# Patient Record
Sex: MALE | Race: WHITE | NOT HISPANIC OR LATINO | Employment: UNEMPLOYED | URBAN - METROPOLITAN AREA
[De-identification: names, ages, dates, MRNs, and addresses within clinical notes are randomized per-mention and may not be internally consistent; named-entity substitution may affect disease eponyms.]

---

## 2018-11-19 ENCOUNTER — OFFICE VISIT - HEALTHEAST (OUTPATIENT)
Dept: INTERNAL MEDICINE | Facility: CLINIC | Age: 58
End: 2018-11-19

## 2018-11-19 ENCOUNTER — COMMUNICATION - HEALTHEAST (OUTPATIENT)
Dept: TELEHEALTH | Facility: CLINIC | Age: 58
End: 2018-11-19

## 2018-11-19 DIAGNOSIS — E11.8 TYPE 2 DIABETES MELLITUS WITH COMPLICATION, UNSPECIFIED WHETHER LONG TERM INSULIN USE: ICD-10-CM

## 2018-11-19 DIAGNOSIS — Z00.00 ROUTINE GENERAL MEDICAL EXAMINATION AT A HEALTH CARE FACILITY: ICD-10-CM

## 2018-11-19 LAB
ALBUMIN SERPL-MCNC: 4.1 G/DL (ref 3.5–5)
ALP SERPL-CCNC: 111 U/L (ref 45–120)
ALT SERPL W P-5'-P-CCNC: 18 U/L (ref 0–45)
ANION GAP SERPL CALCULATED.3IONS-SCNC: 12 MMOL/L (ref 5–18)
AST SERPL W P-5'-P-CCNC: 16 U/L (ref 0–40)
BASOPHILS # BLD AUTO: 0 THOU/UL (ref 0–0.2)
BASOPHILS NFR BLD AUTO: 1 % (ref 0–2)
BILIRUB SERPL-MCNC: 0.5 MG/DL (ref 0–1)
BUN SERPL-MCNC: 17 MG/DL (ref 8–22)
CALCIUM SERPL-MCNC: 10.5 MG/DL (ref 8.5–10.5)
CHLORIDE BLD-SCNC: 98 MMOL/L (ref 98–107)
CHOLEST SERPL-MCNC: 316 MG/DL
CO2 SERPL-SCNC: 25 MMOL/L (ref 22–31)
CREAT SERPL-MCNC: 1.1 MG/DL (ref 0.7–1.3)
CREAT UR-MCNC: 52.2 MG/DL
EOSINOPHIL # BLD AUTO: 0 THOU/UL (ref 0–0.4)
EOSINOPHIL NFR BLD AUTO: 1 % (ref 0–6)
ERYTHROCYTE [DISTWIDTH] IN BLOOD BY AUTOMATED COUNT: 13 % (ref 11–14.5)
FASTING STATUS PATIENT QL REPORTED: YES
GFR SERPL CREATININE-BSD FRML MDRD: >60 ML/MIN/1.73M2
GLUCOSE BLD-MCNC: 390 MG/DL (ref 70–125)
HBA1C MFR BLD: >14 % (ref 3.5–6)
HCT VFR BLD AUTO: 45.8 % (ref 40–54)
HDLC SERPL-MCNC: 67 MG/DL
HGB BLD-MCNC: 15.1 G/DL (ref 14–18)
LDLC SERPL CALC-MCNC: 223 MG/DL
LYMPHOCYTES # BLD AUTO: 1.7 THOU/UL (ref 0.8–4.4)
LYMPHOCYTES NFR BLD AUTO: 40 % (ref 20–40)
MCH RBC QN AUTO: 29.1 PG (ref 27–34)
MCHC RBC AUTO-ENTMCNC: 33 G/DL (ref 32–36)
MCV RBC AUTO: 88 FL (ref 80–100)
MICROALBUMIN UR-MCNC: 5.21 MG/DL (ref 0–1.99)
MICROALBUMIN/CREAT UR: 99.8 MG/G
MONOCYTES # BLD AUTO: 0.4 THOU/UL (ref 0–0.9)
MONOCYTES NFR BLD AUTO: 8 % (ref 2–10)
NEUTROPHILS # BLD AUTO: 2.2 THOU/UL (ref 2–7.7)
NEUTROPHILS NFR BLD AUTO: 51 % (ref 50–70)
PLATELET # BLD AUTO: 327 THOU/UL (ref 140–440)
PMV BLD AUTO: 9.5 FL (ref 8.5–12.5)
POTASSIUM BLD-SCNC: 5.1 MMOL/L (ref 3.5–5)
PROT SERPL-MCNC: 7.5 G/DL (ref 6–8)
RBC # BLD AUTO: 5.19 MILL/UL (ref 4.4–6.2)
SODIUM SERPL-SCNC: 135 MMOL/L (ref 136–145)
TRIGL SERPL-MCNC: 130 MG/DL
TSH SERPL DL<=0.005 MIU/L-ACNC: 2.61 UIU/ML (ref 0.3–5)
WBC: 4.4 THOU/UL (ref 4–11)

## 2018-11-19 ASSESSMENT — MIFFLIN-ST. JEOR: SCORE: 1587.93

## 2018-11-27 ENCOUNTER — COMMUNICATION - HEALTHEAST (OUTPATIENT)
Dept: PHARMACY | Facility: CLINIC | Age: 58
End: 2018-11-27

## 2018-12-21 ENCOUNTER — COMMUNICATION - HEALTHEAST (OUTPATIENT)
Dept: ADMINISTRATIVE | Facility: CLINIC | Age: 58
End: 2018-12-21

## 2019-01-18 ENCOUNTER — AMBULATORY - HEALTHEAST (OUTPATIENT)
Dept: ENDOCRINOLOGY | Facility: CLINIC | Age: 59
End: 2019-01-18

## 2019-01-18 ENCOUNTER — OFFICE VISIT - HEALTHEAST (OUTPATIENT)
Dept: ENDOCRINOLOGY | Facility: CLINIC | Age: 59
End: 2019-01-18

## 2019-01-18 DIAGNOSIS — E10.9 DIABETES MELLITUS TYPE 1 (H): ICD-10-CM

## 2019-01-18 DIAGNOSIS — E11.40 TYPE 2 DIABETES MELLITUS WITH DIABETIC NEUROPATHY, WITHOUT LONG-TERM CURRENT USE OF INSULIN (H): ICD-10-CM

## 2019-01-18 ASSESSMENT — MIFFLIN-ST. JEOR: SCORE: 1611.4

## 2019-02-14 ENCOUNTER — OFFICE VISIT - HEALTHEAST (OUTPATIENT)
Dept: FAMILY MEDICINE | Facility: CLINIC | Age: 59
End: 2019-02-14

## 2019-02-14 ENCOUNTER — COMMUNICATION - HEALTHEAST (OUTPATIENT)
Dept: SCHEDULING | Facility: CLINIC | Age: 59
End: 2019-02-14

## 2019-02-14 DIAGNOSIS — L84 CALLUS OF FOOT: ICD-10-CM

## 2019-02-14 DIAGNOSIS — Z79.4 TYPE 2 DIABETES MELLITUS WITH OTHER SKIN COMPLICATION, WITH LONG-TERM CURRENT USE OF INSULIN (H): ICD-10-CM

## 2019-02-14 DIAGNOSIS — J01.00 ACUTE NON-RECURRENT MAXILLARY SINUSITIS: ICD-10-CM

## 2019-02-14 DIAGNOSIS — E11.628 TYPE 2 DIABETES MELLITUS WITH OTHER SKIN COMPLICATION, WITH LONG-TERM CURRENT USE OF INSULIN (H): ICD-10-CM

## 2019-02-14 DIAGNOSIS — R09.81 NASAL CONGESTION: ICD-10-CM

## 2019-02-14 DIAGNOSIS — R51.9 ACUTE FACIAL PAIN: ICD-10-CM

## 2019-02-18 ENCOUNTER — COMMUNICATION - HEALTHEAST (OUTPATIENT)
Dept: FAMILY MEDICINE | Facility: CLINIC | Age: 59
End: 2019-02-18

## 2019-02-18 DIAGNOSIS — R09.81 NASAL CONGESTION: ICD-10-CM

## 2019-03-09 ENCOUNTER — COMMUNICATION - HEALTHEAST (OUTPATIENT)
Dept: ENDOCRINOLOGY | Facility: CLINIC | Age: 59
End: 2019-03-09

## 2019-03-09 DIAGNOSIS — E11.49 TYPE 2 DIABETES MELLITUS WITH NEUROLOGIC COMPLICATION, WITHOUT LONG-TERM CURRENT USE OF INSULIN (H): ICD-10-CM

## 2019-03-21 ENCOUNTER — AMBULATORY - HEALTHEAST (OUTPATIENT)
Dept: INTERNAL MEDICINE | Facility: CLINIC | Age: 59
End: 2019-03-21

## 2019-03-21 ENCOUNTER — COMMUNICATION - HEALTHEAST (OUTPATIENT)
Dept: INTERNAL MEDICINE | Facility: CLINIC | Age: 59
End: 2019-03-21

## 2020-03-26 ENCOUNTER — OFFICE VISIT - HEALTHEAST (OUTPATIENT)
Dept: INTERNAL MEDICINE | Facility: CLINIC | Age: 60
End: 2020-03-26

## 2020-03-26 DIAGNOSIS — E11.40 TYPE 2 DIABETES MELLITUS WITH DIABETIC NEUROPATHY, WITHOUT LONG-TERM CURRENT USE OF INSULIN (H): ICD-10-CM

## 2020-03-26 DIAGNOSIS — R30.0 DYSURIA: ICD-10-CM

## 2020-03-30 ENCOUNTER — COMMUNICATION - HEALTHEAST (OUTPATIENT)
Dept: INTERNAL MEDICINE | Facility: CLINIC | Age: 60
End: 2020-03-30

## 2020-08-11 ENCOUNTER — COMMUNICATION - HEALTHEAST (OUTPATIENT)
Dept: INTERNAL MEDICINE | Facility: CLINIC | Age: 60
End: 2020-08-11

## 2020-08-11 ENCOUNTER — RECORDS - HEALTHEAST (OUTPATIENT)
Dept: ADMINISTRATIVE | Facility: OTHER | Age: 60
End: 2020-08-11

## 2020-08-11 ENCOUNTER — HOSPITAL ENCOUNTER (OUTPATIENT)
Dept: LAB | Facility: CLINIC | Age: 60
End: 2020-08-11
Attending: STUDENT IN AN ORGANIZED HEALTH CARE EDUCATION/TRAINING PROGRAM

## 2020-08-11 LAB
ALBUMIN (URINE) MG/SPEC: 20 MG/DL
ALT SERPL W/O P-5'-P-CCNC: 26 U/L (ref 0–70)
AST SERPL-CCNC: 14 U/L (ref 0–45)
CHOLEST SERPL-MCNC: 290 MG/DL
CREAT SERPL-MCNC: 0.88 MG/DL (ref 0.66–1.25)
GFR ESTIMATE EXT - HISTORICAL: >90 ML/MIN/1.73M2
GFR ESTIMATE, IF BLACK EXT - HISTORICAL: >90 ML/MIN/1.73M2
HDLC SERPL-MCNC: 64 MG/DL
LDLC SERPL CALC-MCNC: 204 MG/DL
NON HDL CHOL. (LDL+VLDL): 226 MG/DL
TRIGLYCERIDES (HISTORICAL CONVERSION): 108 MG/DL

## 2020-08-13 ENCOUNTER — COMMUNICATION - HEALTHEAST (OUTPATIENT)
Dept: INTERNAL MEDICINE | Facility: CLINIC | Age: 60
End: 2020-08-13

## 2020-08-13 ENCOUNTER — OFFICE VISIT - HEALTHEAST (OUTPATIENT)
Dept: INTERNAL MEDICINE | Facility: CLINIC | Age: 60
End: 2020-08-13

## 2020-08-13 DIAGNOSIS — Z12.11 SCREEN FOR COLON CANCER: ICD-10-CM

## 2020-08-13 DIAGNOSIS — G62.9 PERIPHERAL POLYNEUROPATHY: ICD-10-CM

## 2020-08-13 DIAGNOSIS — Z00.00 HEALTH MAINTENANCE EXAMINATION: ICD-10-CM

## 2020-08-13 DIAGNOSIS — E11.40 TYPE 2 DIABETES MELLITUS WITH DIABETIC NEUROPATHY, WITHOUT LONG-TERM CURRENT USE OF INSULIN (H): ICD-10-CM

## 2020-08-13 DIAGNOSIS — N30.00 ACUTE CYSTITIS WITHOUT HEMATURIA: ICD-10-CM

## 2020-08-13 DIAGNOSIS — I73.9 CLAUDICATION (H): ICD-10-CM

## 2020-08-13 DIAGNOSIS — M25.551 HIP PAIN, RIGHT: ICD-10-CM

## 2020-08-13 RX ORDER — ROSUVASTATIN CALCIUM 10 MG/1
10 TABLET, COATED ORAL AT BEDTIME
Qty: 30 TABLET | Refills: 11 | Status: ON HOLD | OUTPATIENT
Start: 2020-08-13 | End: 2022-01-11

## 2020-08-13 ASSESSMENT — MIFFLIN-ST. JEOR: SCORE: 1564.68

## 2020-08-14 ENCOUNTER — COMMUNICATION - HEALTHEAST (OUTPATIENT)
Dept: NURSING | Facility: CLINIC | Age: 60
End: 2020-08-14

## 2020-08-14 ENCOUNTER — COMMUNICATION - HEALTHEAST (OUTPATIENT)
Dept: INTERNAL MEDICINE | Facility: CLINIC | Age: 60
End: 2020-08-14

## 2020-08-14 RX ORDER — GLUCOSAMINE HCL/CHONDROITIN SU 500-400 MG
1 CAPSULE ORAL 3 TIMES DAILY
Qty: 100 STRIP | Refills: 11 | Status: SHIPPED | OUTPATIENT
Start: 2020-08-14

## 2020-08-17 ENCOUNTER — RECORDS - HEALTHEAST (OUTPATIENT)
Dept: HEALTH INFORMATION MANAGEMENT | Facility: CLINIC | Age: 60
End: 2020-08-17

## 2020-08-19 ENCOUNTER — COMMUNICATION - HEALTHEAST (OUTPATIENT)
Dept: INTERNAL MEDICINE | Facility: CLINIC | Age: 60
End: 2020-08-19

## 2020-08-20 ENCOUNTER — COMMUNICATION - HEALTHEAST (OUTPATIENT)
Dept: NURSING | Facility: CLINIC | Age: 60
End: 2020-08-20

## 2020-08-27 ENCOUNTER — COMMUNICATION - HEALTHEAST (OUTPATIENT)
Dept: NURSING | Facility: CLINIC | Age: 60
End: 2020-08-27

## 2020-08-27 ENCOUNTER — OFFICE VISIT - HEALTHEAST (OUTPATIENT)
Dept: INTERNAL MEDICINE | Facility: CLINIC | Age: 60
End: 2020-08-27

## 2020-08-27 DIAGNOSIS — E78.2 MIXED HYPERLIPIDEMIA: ICD-10-CM

## 2020-08-27 DIAGNOSIS — E11.65 TYPE 2 DIABETES MELLITUS WITH HYPERGLYCEMIA, WITHOUT LONG-TERM CURRENT USE OF INSULIN (H): ICD-10-CM

## 2020-08-28 ENCOUNTER — COMMUNICATION - HEALTHEAST (OUTPATIENT)
Dept: NURSING | Facility: CLINIC | Age: 60
End: 2020-08-28

## 2020-08-28 ASSESSMENT — ACTIVITIES OF DAILY LIVING (ADL): DEPENDENT_IADLS:: INDEPENDENT

## 2020-09-10 ENCOUNTER — OFFICE VISIT - HEALTHEAST (OUTPATIENT)
Dept: INTERNAL MEDICINE | Facility: CLINIC | Age: 60
End: 2020-09-10

## 2020-09-10 DIAGNOSIS — E11.65 TYPE 2 DIABETES MELLITUS WITH HYPERGLYCEMIA, WITHOUT LONG-TERM CURRENT USE OF INSULIN (H): ICD-10-CM

## 2020-09-10 ASSESSMENT — MIFFLIN-ST. JEOR: SCORE: 1592.81

## 2020-09-11 ENCOUNTER — COMMUNICATION - HEALTHEAST (OUTPATIENT)
Dept: INTERNAL MEDICINE | Facility: CLINIC | Age: 60
End: 2020-09-11

## 2020-09-14 ENCOUNTER — COMMUNICATION - HEALTHEAST (OUTPATIENT)
Dept: NURSING | Facility: CLINIC | Age: 60
End: 2020-09-14

## 2020-09-14 ENCOUNTER — COMMUNICATION - HEALTHEAST (OUTPATIENT)
Dept: INTERNAL MEDICINE | Facility: CLINIC | Age: 60
End: 2020-09-14

## 2020-11-09 ENCOUNTER — COMMUNICATION - HEALTHEAST (OUTPATIENT)
Dept: INTERNAL MEDICINE | Facility: CLINIC | Age: 60
End: 2020-11-09

## 2020-11-09 DIAGNOSIS — E11.40 TYPE 2 DIABETES MELLITUS WITH DIABETIC NEUROPATHY, WITHOUT LONG-TERM CURRENT USE OF INSULIN (H): ICD-10-CM

## 2021-06-02 VITALS — BODY MASS INDEX: 21.43 KG/M2 | WEIGHT: 158 LBS

## 2021-06-02 VITALS — BODY MASS INDEX: 22.8 KG/M2 | HEIGHT: 72 IN | WEIGHT: 168.3 LBS

## 2021-06-02 VITALS — HEIGHT: 72 IN | WEIGHT: 163.13 LBS | BODY MASS INDEX: 22.09 KG/M2

## 2021-06-04 VITALS
BODY MASS INDEX: 20.75 KG/M2 | SYSTOLIC BLOOD PRESSURE: 112 MMHG | DIASTOLIC BLOOD PRESSURE: 68 MMHG | HEART RATE: 100 BPM | WEIGHT: 153 LBS

## 2021-06-04 VITALS
OXYGEN SATURATION: 96 % | HEART RATE: 104 BPM | DIASTOLIC BLOOD PRESSURE: 62 MMHG | SYSTOLIC BLOOD PRESSURE: 96 MMHG | WEIGHT: 158 LBS | BODY MASS INDEX: 21.4 KG/M2 | HEIGHT: 72 IN

## 2021-06-04 VITALS
HEART RATE: 78 BPM | HEIGHT: 72 IN | DIASTOLIC BLOOD PRESSURE: 62 MMHG | BODY MASS INDEX: 22.24 KG/M2 | WEIGHT: 164.2 LBS | SYSTOLIC BLOOD PRESSURE: 122 MMHG

## 2021-06-07 NOTE — PROGRESS NOTES
Curtis Varghese is a 59 y.o. male who is being evaluated via a billable telephone visit.      Curtis Varghese complains of    Chief Complaint   Patient presents with     Urinary Retention     Diabetes       Assessment/Plan:  1. Dysuria  Curtis Varghese he is a 59-year-old man who presents for telephone visit related to urinary issues.  From what I can tell, he has polyuria and polydipsia secondary to probable hyperglycemia.  He essentially has not done anything since the last clinic visit with 1 of our former colleagues and also with endocrinology.  He has not taken the insulin that was recommended.  Since he had a little dysuria, we will screen for UTI.  I have a low suspicion that his prostate is the primary issue.  - Urinalysis-UC if Indicated; Future    2. Type 2 diabetes mellitus with diabetic neuropathy, without long-term current use of insulin (H)  Curtis essentially presented 1 year ago with wildly uncontrolled diabetes and since then has done nearly nothing about it.  It seems he had a visit with endocrinology and reading between the lines, the endocrinology specialist had a hard time getting through to him.  I sense that Curtis has some skepticism with standard of care medicine and may have some unrealistic expectations with what can be accomplished with diet alone.  Generally when the A1c is above 14, most people need some degree of insulin therapy, if only for a short term to get the numbers back under control.  I fully expect the A1c to be above 14, the glucose to be very high, and there may or may not be presence of UTI, but we will have to have a heart-to-heart regarding diabetes control.  To top it off, he has a very high LDL cholesterol over 220, I think he is at high risk for having a heart attack in the intermediate future.  - Basic Metabolic Panel; Future  - Glycosylated Hemoglobin A1c; Future      Additional provider notes:     Mr. Varghese is a 59-year-old man with history of type 2  "diabetes who presents for telephone visit out of concern for urinary issues.    In recent months, he grew concerned about his prostate. For some time he has had symptoms of discomfort in the lower inner hip bones (groin), exhaustion (normal sleep not enough to recharge), urinating takes longer, slight burning.  He notes some hesitation.  There urinary stream is average. He is able to completely empty.  He notes more frequent urination.  He is set up to get a PSA test separately so he does not want to check through us.     The other day he noted the stool was so compacted that he had trouble passing it.  It felt like it was trying to pass a rock.  He has not had any recent colonoscopy (alluding to some connection to bladder or prostate issues).     He was seen once in our clinic by one of the family medicine providers who was filling in during a period of shortage.  He does not have a PCP.  He had a physical in November 2018, and at that time his A1c was above the detectable limits (>14), had dramatically elevated LDL at 223, glucose was 390, and microalbumin/creatinine ratio was significantly elevated at 99.8.  He saw endocrinology but apparently decided not to take the insulin recommended.      He cites various dietary means to control diabetes: keto diet and juicing (vegetable, not fruit).  He has been busy and not checking his blood sugars lately, too busy to diet.      He feels the need to drink a lot of water.     His workplace requires a doctor's note to allow him to have certain food in the workplace area.  He plans to do a ketogenic diet.  He wants me to write a note to allow him to have food in the workplace which he thinks would help support his keto diet.        I have reviewed and updated the patient's Past Medical History, Social History, Family History, Allergies and Medication List.      The patient has been notified of following:     \"This telephone visit will be conducted via a call between you and " "your physician/provider. We have found that certain health care needs can be provided without the need for a physical exam.  This service lets us provide the care you need with a short phone conversation.  If a prescription is necessary we can send it directly to your pharmacy.  If lab work is needed we can place an order for that and you can then stop by our lab to have the test done at a later time.    If during the course of the call the physician/provider feels a telephone visit is not appropriate, you will not be charged for this service.\"       Phone call duration:  20 minutes      "

## 2021-06-07 NOTE — TELEPHONE ENCOUNTER
Pt requesting note from dr silva    He has left a note for Dr Silva    PLEASE CALL PT WHEN NOT IS READY  [phone] [employer]

## 2021-06-07 NOTE — TELEPHONE ENCOUNTER
Reviewed note from pt.  Pt is requesting a letter from Dr. Silva stating for medical reasons he needs to have the allowance of special foods, medications and juices in his work area.    Will route to Dr. Silva to advise if okay for letter.

## 2021-06-10 NOTE — TELEPHONE ENCOUNTER
Attempted to contact pt.  Home phone VM inbox is full, unable to leave message.  Attempted to contact mobile phone and number is not available.  Will try again later.  If pt calls, he can fax his [facility] paperwork to [phone].

## 2021-06-10 NOTE — TELEPHONE ENCOUNTER
Spoke with pt who reports he's work will be faxing over the paperwork to [place], as he's having an [facility] care appt there tomorrow.

## 2021-06-10 NOTE — PROGRESS NOTES
"[facility] Clinic Note    Curtis Varghese   59 y.o. male    Date of Visit: 8/13/2020  Chief Complaint   Patient presents with     Annual Exam     not fasting     Colon Cancer Screening     patient due, orders pending       ASSESSMENT/PLAN  1. Health maintenance examination     2. Type 2 diabetes mellitus with diabetic neuropathy, without long-term current use of insulin (H)  Ambulatory referral to Care Management (Primary Care)    rosuvastatin (CRESTOR) 10 MG tablet    metFORMIN (GLUCOPHAGE) 500 MG tablet    insulin NPH-insulin regular (NOVOLIN 70-30) 100 unit/mL (70-30) injection    Ambulatory referral to Diabetes Education Program (Existing Diagnosis)    pen needle, diabetic (UNIFINE PENTIPS) 31 gauge x 3/16\" Ndle   3. Screen for colon cancer  Ambulatory referral for Colonoscopy   4. Peripheral polyneuropathy     5. Hip pain, right     6. Claudication (H)  US JAVI Doppler No Exercise, 1-2 Levels, Bilateral   7. Acute cystitis without hematuria       ---------------------------------------------    1. Curtis Varghese is here for a physical. Alarmingly, we last saw him 21 months ago at which time his a1c was >14%.  It sounds like he has not taken insulin or medications in the meantime.  This is the main issue, see #2.  He is agreeable to updating colonoscopy.  We weren't able to get to the topic of vaccines, but he is current on Td.  He is not obese, but should be exercising regularly.  His low energy is likely from uncontrolled diabetes    2. As suspected, nothing has changed to improve wildly uncontrolled diabetes over the last 21 months.  Several times he brought up the subject of \"juicing\" or \"berberine\".  The idea that a supplement of this sort is either naive or magical thinking. Why he has not sought care or followed up as requested (tried to get him in for A1c back in March), I cannot answer.  He alludes to some financial obstacles such as affording insulin (mentioned this when talking to " endocrinology- saw once in 2019 and told them he would not take insulin).  He has developing symptoms of retinopathy or cataracts, peripheral neuropathy, and possibly even claudication.    --start aspirin and rosuvastatin (LDL >200)  --start metformin 500 mg two times a day  --start insulin NPH/reg 70/30 pen-- take 10 units two times a day to start with need to increase (this is only starting 0.27 units/kg/day)  --possibly can get this above medication in pen form through [facility] (Reli-[employer]).   --prescribe testing supplies, glucometer, test strips, lancets  --referral to diabetes educator (I think if we leave it to him he may not be good about making appointments to come back)      3. Colonoscopy ordered    4. Suspect developing neuropathy based on symptoms    5. R hip pain- not tender, no hip ROM difficulties or pain with this.  He was trying to explain he feels that there is a restriction in blood flow to legs.  He has good posterior tibial pulses, but some symptoms of leg cramping.  Given very odd way of presenting history, I thought we check ABIs to be sure    6. See above    7. Tests positive for UTI- treat with cipro 10 days.  Cause is likely uncontrolled diabetes, will focus on better control    Return in about 2 weeks (around 8/27/2020) for Diabetes.      SUBJECTIVE  Curtis Varghese is a 59-year-old man with history of type 2 diabetes, who presents for annual physical.    Last A1c was checked in 2018, found to be off the charts about 14.  He has failed to follow-up in person since then with me, but did see endocrinology once in 2019.  He explained he would not take insulin because of cost.      He has been feeling physically exhausted, sleeping 10.5 hours, goes to work, but not having energy to do anything else during the week.  Even when getting to work he is not very motivated.      He started to get right hip pain and stopped carrying his phone there.   He has a feeling of something blocking the bloodflow  to the legs.  He feels some numbness in his feet, has pain walking without shoes, so he tries to wear his shoes around (under ball of feet).  He's taking a dietary supplement which helps. He tries Berberine.       Eyes feel like they are changing, glasses not working as they should any more. Last eye exam was around 2015.       ROS A comprehensive review of systems was performed and was otherwise negative    Medications, allergies, and problem list were reviewed and updated    Patient Active Problem List   Diagnosis     Type 2 diabetes mellitus with neurologic complication, without long-term current use of insulin (H)     Nasal congestion     Peripheral polyneuropathy     Past Medical History:   Diagnosis Date     Acute facial pain 2/14/2019     Type 2 diabetes mellitus with neurologic complication, without long-term current use of insulin (H) 1/20/2019     No past surgical history on file.  Social History     Socioeconomic History     Marital status: Single     Spouse name: Not on file     Number of children: Not on file     Years of education: Not on file     Highest education level: Not on file   Occupational History     Not on file   Social Needs     Financial resource strain: Not on file     Food insecurity     Worry: Not on file     Inability: Not on file     Transportation needs     Medical: Not on file     Non-medical: Not on file   Tobacco Use     Smoking status: Never Smoker     Smokeless tobacco: Never Used   Substance and Sexual Activity     Alcohol use: No     Frequency: Never     Drug use: No     Sexual activity: Never   Lifestyle     Physical activity     Days per week: Not on file     Minutes per session: Not on file     Stress: Not on file   Relationships     Social connections     Talks on phone: Not on file     Gets together: Not on file     Attends [employer] service: Not on file     Active member of club or organization: Not on file     Attends meetings of clubs or organizations: Not on file      "Relationship status: Not on file     Intimate partner violence     Fear of current or ex partner: Not on file     Emotionally abused: Not on file     Physically abused: Not on file     Forced sexual activity: Not on file   Other Topics Concern     Not on file   Social History Narrative     Not on file     No family history on file.    Current Outpatient Medications   Medication Sig Dispense Refill     aspirin 81 MG EC tablet Take 1 tablet (81 mg total) by mouth daily. 150 tablet 2     ciprofloxacin HCl (CIPRO) 500 MG tablet Take 1 tablet (500 mg total) by mouth 2 (two) times a day for 10 days. 20 tablet 0     insulin NPH-insulin regular (NOVOLIN 70-30) 100 unit/mL (70-30) injection Inject 10 Units under the skin 2 (two) times a day before meals. 11.65 Type 2 with hyperglycemia 6 mL 0     metFORMIN (GLUCOPHAGE) 500 MG tablet Take 1 tablet (500 mg total) by mouth 2 (two) times a day with meals. 60 tablet 11     pen needle, diabetic (UNIFINE PENTIPS) 31 gauge x 3/16\" Ndle Use 1 each As Directed 2 (two) times a day. 100 each 3     rosuvastatin (CRESTOR) 10 MG tablet Take 1 tablet (10 mg total) by mouth at bedtime. 30 tablet 11     No current facility-administered medications for this visit.        No Known Allergies    EXAM  Vitals:    08/13/20 1412   BP: 96/62   Patient Site: Left Arm   Patient Position: Sitting   Cuff Size: Adult Regular   Pulse: (!) 104   SpO2: 96%   Weight: 158 lb (71.7 kg)   Height: 6' (1.829 m)         General: alert, no distress  HEENT: sclerae anicteric, moist oral mucosa  Heart: Regular rate and rhythm, no murmurs.  No pretibial edema.  Warm extremities  Lungs: Clear to auscultation bilat  Gastrointestinal: abdomen is soft, non-tender, non-distended.    Skin: warm/dry, no rashes  Neuro: no gross abnormalities  MSK: normal ROM R hip without pain, no tenderness over greater trochanter    8/11/2020 labs-- scanned    A1c >14%    Request records from juan c Silva, DO  Internal " [facility]

## 2021-06-10 NOTE — PROGRESS NOTES
The Clinic Community Health Worker spoke with the patient today to discuss possible Clinic Care Coordination enrollment.  The service was described to the patient and immediate needs were discussed.  The patient agreed to enrollment and an assessment was scheduled.  The patient was provided with contact information for the clinic CHW.             Assessment date: 8/20 @ 1pm    Patient had annual visit 8/13- poor controlled diabetes due to cost of prescriptions and insulin, patient would benefit from MTM involvement as well but is very hesitant.

## 2021-06-10 NOTE — TELEPHONE ENCOUNTER
Attempted to contact pt to get more information.  Mobile number is not available and home number as a full voicemail.  Will attempt to contact pt again later.  If pt calls back, please get more information on his forms.  Depending on what it is, he may need to see another provider as Dr. Silva is no longer at the clinic.

## 2021-06-10 NOTE — PROGRESS NOTES
Clinic Care Coordination Contact  Due Date: Within 2 weeks from today's date, 8/20/2020  Delegation: No Show for RN appointment. Please follow unsuccessful outreach, no show standard work.

## 2021-06-10 NOTE — TELEPHONE ENCOUNTER
"Patient Returning Call  Reason for call: FMLA paperwork  Information relayed to patient:  The writer read the following to patient per RN:Pt just saw Dr Silva 8/13 and should not have to see someone else. Dr Silva is on vacation until 8/28 which is also his last day but he can still sign the forms on 8/28 when he checks in one last time on things needing to be done.  Writer asked patient if he had FMLA forms and he replied\"You tell me where to get them!\" Also making other demanding and disparignin remarks to writer for eg:  \"So are you have not answered any of questions.\" Writer apologized that he felt That way. \"Well you keep being sorry and I will get the papers faxed to clinic.\"  Patient will call back to give to get fax for clinic.   Patient has additional questions:  No  If YES, what are your questions/concerns:  NA  Okay to leave a detailed message?: No       "

## 2021-06-10 NOTE — TELEPHONE ENCOUNTER
"Spoke with pt and relayed pcp message.  Pt understanding.  Pt did request LPN to speak in general terms because he \"doesn't trust the phone.\"  Pt agreeable to starting medications pcp prescribed.  Pt concerned about cost of medication.  Advised pt to contact clinic back if the costs were too expensive, we can possibly refer pt to Prescription Assistance Program through [place].  Pt understanding, did not want to schedule with DM educator at this time.   "

## 2021-06-10 NOTE — TELEPHONE ENCOUNTER
"Attempted to contact pt at number below, [phone], recording states that \"caller is not available, please try again later.\"   is full on home number.     Please inform pt if he calls back that DR. Silva is no longer at clinic and cannot fill out his forms.  Pt will need to establish care with a new provider to fill out his FMLA forms.   "

## 2021-06-10 NOTE — TELEPHONE ENCOUNTER
Tried to call tonight-- has UTI based on labs from 8/11 which might be contributing to him feeling poorly.  Will send in antibiotic.      Also will try to call him tomorrow to discuss diabetes management.  If you get to him before you can explain...    I am going to prescribe him 70/30 insulin to take two times a day before meals, can be dispensed at [facility] as this could be cheaper (Reli-on pens).  Needs to start cholesterol medication for dramatically elevated LDL >190.  Should also begin metformin.     Needs close follow-up with DM educator.      Dietary supplements and juicing will not alone be enough this time.  His diabetes is not controlled.

## 2021-06-10 NOTE — TELEPHONE ENCOUNTER
Who is calling:  Patient   Reason for Call:  Patient stated that he would like lab orders sent to [facility]. Patient stated that he would like this take care of today.  Date of last appointment with primary care: n/a  Okay to leave a detailed message: No  [phone]

## 2021-06-10 NOTE — PROGRESS NOTES
Clinic Care Coordination Contact  Community Health Worker Initial Outreach    CHW Initial Information Gathering:  Referral Source: PCP  Preferred Hospital: [facility]  [phone]  Current living arrangement:: I live alone  Supplies used at home:: Diabetic Supplies  CHW Additional Questions  MyChart active?: No  Patient agreeable to assistance with activating MyChart?: No    Patient accepts CC: Yes. Patient scheduled for assessment with CCC RN  on 8/28/2020 at 1pm. Patient noted desire to discuss diabetes management and FMLA.

## 2021-06-10 NOTE — TELEPHONE ENCOUNTER
Pt just saw Dr Silva 8/13 and should not have to see someone else. Dr Silva is on vacation until 8/28 which is also his last day but he can still sign the forms on 8/28 when he checks in one last time on things needing to be done. Ragini, please coordinate with Dr Silva to sign the forms. Thanks

## 2021-06-10 NOTE — TELEPHONE ENCOUNTER
"Forms Request  Name of form/paperwork: [place]  Have you been seen for this request: Seen Dr Silva for h  and p.  Did not have the forms at that time.  Please advise if another appointment is needed.  Patient irritated and having issues with getting to the reason he called.  He wants to discuss where his records go and every chcuk ,tamy ,and omega has his personal information.?\" Transferred to ambulatory manager .   Do we have the form: unknown Patient escalated after writer asked what his blood sugar was as he said he is having problems with his diabetes.  Writer eanted to get him to triage if needed.    When is form needed by: unknown  How would you like the form returned: unknown  Patient Notified form requests are processed in 3-5 business days: No    Okay to leave a detailed message? No        "

## 2021-06-10 NOTE — TELEPHONE ENCOUNTER
Labs performed and contained in a scan (like a1c, BMP, lipids, etc).  Please find scan in the folder and make sure at least A1c and lipid values are entered to get credit for this (and for easier tracking)

## 2021-06-11 NOTE — TELEPHONE ENCOUNTER
Paperwork faxed again. I notified patient that he he needs additional time off, he needs to discuss with new PCP once established.    Patient would like a call from Roland for help making appointment to establish. Routing to Roland.  Millie Cartwright CMA ............... 11:55 AM, 09/14/20

## 2021-06-11 NOTE — PROGRESS NOTES
CCC RN spoke with patient today regarding the CCC program. Writer explained program to patient. Patient said he thought this was a program that could provide financial assistance to him the two weeks he was on FMLA. Patient stated he was not interested in the program at this time. He stated he has the supports he currently needs at this time. He was recently seen by a CNP at the [facility] and has restarted his diabetic medications. Patient denied any concerns about his medications. He was informed he could be referred back to [facility] by his PCP is needs or concerns should arise.

## 2021-06-11 NOTE — PROGRESS NOTES
Clinic Care Coordination Contact    Follow Up Progress Note      CCC RN spoke with patient today to assist with establishing care with a PCP. Patient agreed to establish care with Dr. Hollingsworth at the [facility]. Phone appointment was scheduled for 9/23/20 at 12:20.

## 2021-06-11 NOTE — TELEPHONE ENCOUNTER
Who is calling:  Patient  Reason for Call:  Pt states questions following his FMLA paperwork were faxed in 08/31. Pt states he has to tell his job an answer by Monday.  Pt states his recent visit that Pili Hernandez CNP  stated she was sending his information to a care team, that she will not be handling this.  Pt unsure of what care team.  Please advise.  Date of last appointment with primary care: N/A  Okay to leave a detailed message: No

## 2021-06-11 NOTE — TELEPHONE ENCOUNTER
I called pt, unable to leave a message. Please relay message below. thanks    Pili filled out paperwork and it is good for another week for him to be out of work until 9/18/20. You should be hearing from our care coordinator to help get him set up with another provider. Let us know if you have any questions.

## 2021-06-11 NOTE — TELEPHONE ENCOUNTER
Who is calling:  The patient   Reason for Call:  The patient would like to know where the FMLA papers are? The [employer] never received the FMLA papers back as of 911/20. The patient would like a call to establish when he should return to work. In a previous message that was relayed to the patient that the return to work date is 9/18/20. The patient would not discuss with writer if he is ready for going back to work or not.   Date of last appointment with primary care:   Okay to leave a detailed message: No

## 2021-06-11 NOTE — TELEPHONE ENCOUNTER
Patient Returning Call  Reason for call:  Return call   Information relayed to patient:  Caller was informed of message below.   Patient has additional questions:  Yes  If YES, what are your questions/concerns:  Caller stated that he will be calling back if he feels that he can go back to work.   Okay to leave a detailed message?: No

## 2021-06-11 NOTE — PROGRESS NOTES
The patient presented today for renewal of his FMLA paperwork today. Per his last visit with the provider on 08/27, the patient was supposed to establish care with another provider, and start working with the care management, primary care team.     He misunderstood the phone call the occurred on 08/28/20. He did not want to discuss his blood sugar readings, or medications today. He only wanted his FMLA paperwork renewed.    Spoke with David Myhre, RN Care Management , and his team will reach out to the patient today to discuss establishing care with a [facility] Physician and their program.    This was a no charge visit today. Provider will extend FMLA paperwork until he is seen by his new primary care provider.

## 2021-06-13 NOTE — TELEPHONE ENCOUNTER
RN cannot approve Refill Request    RN can NOT refill this medication PCP messaged that patient is overdue for Labs, Protocol failed and NO refill given and Provider no longer at clinic. Needs new RX from a current provider.. Last office visit: Visit date not found Last Physical: 8/13/2020 Last MTM visit: Visit date not found Last visit same specialty: 9/10/2020 Pili Hernandez CNP.  Next visit within 3 mo: Visit date not found  Next physical within 3 mo: Visit date not found      Itzel Jennings, [facility] Triage/Med Refill 11/12/2020    Requested Prescriptions   Pending Prescriptions Disp Refills     NOVOLIN 70-30 FLEXPEN U-100 100 unit/mL (70-30) pen [Pharmacy Med Name: NOVOLIN 70/30 U-100 FLEXPEN INJ 3ML] 6 mL 0     Sig: INJECT 10 UNITS UNDER THE SKIN TWICE DAILY BEFORE MEALS       Insulin/GLP-1 Refill Protocol Failed - 11/9/2020  1:51 PM        Failed - A1C in last 6 months     Hemoglobin A1c   Date Value Ref Range Status   11/19/2018 >14.0 (H) 3.5 - 6.0 % Final               Failed - Microalbumin in last year     Microalbumin, Random Urine   Date Value Ref Range Status   11/19/2018 5.21 (H) 0.00 - 1.99 mg/dL Final                  Passed - Visit with PCP or prescribing provider visit in last 6 months     Last office visit with prescriber/PCP: Visit date not found OR same dept: Visit date not found OR same specialty: 9/10/2020 Pili Hernandez CNP Last physical: 8/13/2020 Last MTM visit: Visit date not found     Next appt within 3 mo: Visit date not found  Next physical within 3 mo: Visit date not found  Prescriber OR PCP: Chidi Silva DO  Last diagnosis associated with med order: 1. Type 2 diabetes mellitus with diabetic neuropathy, without long-term current use of insulin (H)  - NOVOLIN 70-30 FLEXPEN U-100 100 unit/mL (70-30) pen [Pharmacy Med Name: NOVOLIN 70/30 U-100 FLEXPEN INJ 3ML]; INJECT 10 UNITS UNDER THE SKIN TWICE DAILY BEFORE MEALS  Dispense: 6 mL; Refill: 0    If  protocol passes may refill for 6 months if within 3 months of last provider visit (or a total of 9 months).              Passed - Blood pressure in last year     BP Readings from Last 1 Encounters:   09/10/20 122/62             Passed - Creatinine done in last year     Creatinine   Date Value Ref Range Status   08/11/2020 0.88 0.66 - 1.25 mg/dL Final

## 2021-06-16 PROBLEM — E11.49 TYPE 2 DIABETES MELLITUS WITH NEUROLOGIC COMPLICATION, WITHOUT LONG-TERM CURRENT USE OF INSULIN (H): Status: ACTIVE | Noted: 2019-01-20

## 2021-06-16 PROBLEM — R09.81 NASAL CONGESTION: Status: ACTIVE | Noted: 2019-02-14

## 2021-06-16 PROBLEM — G62.9 PERIPHERAL POLYNEUROPATHY: Status: ACTIVE | Noted: 2020-08-13

## 2021-06-17 NOTE — PATIENT INSTRUCTIONS - HE
Patient Instructions by Prisca Gilbert CNS at 2/14/2019  8:40 AM     Author: Prisca Gilbert CNS Service: -- Author Type: Clinical Nurse Specialist    Filed: 2/14/2019 10:03 AM Encounter Date: 2/14/2019 Status: Signed    : Prisca Gilbert CNS (Clinical Nurse Specialist)         Patient Education     Step-by-Step  Checking Your Blood Sugar    Date Last Reviewed: 5/1/2016 2000-2017 [employer]. [address] [place] [address]. All rights reserved. This information is not intended as a substitute for professional medical care. Always follow your healthcare professional's instructions.           Patient Education     Diet: Diabetes  Food is an important tool that you can use to control diabetes and stay healthy. Eating well-balanced meals in the correct amounts will help you control your blood glucose levels and prevent low blood sugar reactions. It will also help you reduce the health risks of diabetes. There is no one specific diet that is right for everyone with diabetes. But there are general guidelines to follow. A registered dietitian (RD) will create a tailored diet approach thats just right for you. He or she will also help you plan healthy meals and snacks. If you have any questions, call your dietitian for advice.     Guidelines for success  Talk with your healthcare provider before starting a diabetes diet or weight loss program. If you haven't talked with a dietitian yet, ask your provider for a referral. The following guidelines can help you succeed:    Select foods from the 6 food groups below. Your dietitian will help you find food choices within each group. He or she will also show you serving sizes and how many servings you can have at each meal.  ? Grains, beans, and starchy vegetables  ? Vegetables  ? Fruit  ? Milk or yogurt  ? Meat, poultry, fish, or tofu  ? Healthy fats    Check your blood sugar levels as directed by your provider. Take any  medicine as prescribed by your provider.    Learn to read food labels and pick the right portion sizes.    Eat only the amount of food in your meal plan. Eat about the same amount of food at regular times each day. Dont skip meals. Eat meals 4 to 5 hours apart, with snacks in between.    Limit alcohol. It raises blood sugar levels. Drink water or calorie-free diet drinks that use safe sweeteners.    Eat less fat to help lower your risk of heart disease. Use nonfat or low-fat dairy products and lean meats. Avoid fried foods. Use cooking oils that are unsaturated, such as olive, canola, or peanut oil.    Talk with your dietitian about safe sugar substitutes.    Avoid added salt. It can contribute to high blood pressure, which can cause heart disease. People with diabetes already have a risk of high blood pressure and heart disease.    Stay at a healthy weight. If you need to lose weight, cut down on your portion sizes. But dont skip meals. Exercise is an important part of any weight management program. Talk with your provider about an exercise program thats right for you.    For more information about the best diet plan for you, talk with a registered dietitian (RD). To find an RD in your area, contact:  ? Academy of Nutrition and Dietetics www.eatright.org  ? The American Diabetes Association 164-593-3452 www.diabetes.org  Date Last Reviewed: 8/1/2016 2000-2017 [employer]. [address] [place] [address]. All rights reserved. This information is not intended as a substitute for professional medical care. Always follow your healthcare professional's instructions.           Patient Education     Treating Corns and Calluses  If your corns or calluses are mild, reducing friction may help. Different shoes, moleskin patches, or soft pads may be all the treatment you need. In more severe cases, treating tissue buildup may require your doctors care. Sometimes custom-made shoe inserts (orthotics)  or special pads are prescribed to reduce friction and pressure.    Change shoes  If you have corns, your doctor may suggest wearing shoes that have more toe room. This way, buckled joints are less likely to be pinched against the top of the shoe. If you have calluses, wearing a cushioned insole, arch support, or heel counter can help reduce friction.  Visit your doctor  In some cases, your doctor may trim away the outer layers of skin that make up the corn or callus. For a painful corn, medicine may be injected beneath the built-up tissue.  Wear orthotics  Orthotics are specially made to meet the needs of your feet. They cushion calluses or divert pressure away from these problem areas. Worn as directed, orthotics help limit existing problems and prevent new ones from forming.  If you need surgery  If a bone or joint is out of place, certain parts of your foot may be under too much pressure. This can cause severe corns and calluses. In such cases, surgery may be the best way to correct the problem.  Outpatient procedures  In most cases, surgery to improve bone position is an outpatient procedure. Your doctor may cut away excess bone, reposition prominent bones, or even fuse joints. Sometimes tendons or ligaments are cut to reduce tension on a bone or joint. Your doctor will talk with you about the procedure that is best suited to your needs.  Date Last Reviewed: 7/1/2016 2000-2017 [employer]. [address] [place] [address]. All rights reserved. This information is not intended as a substitute for professional medical care. Always follow your healthcare professional's instructions.           Patient Education     Treating Corns and Calluses  If your corns or calluses are mild, reducing friction may help. Different shoes, moleskin patches, or soft pads may be all the treatment you need. In more severe cases, treating tissue buildup may require your doctors care. Sometimes custom-made shoe  inserts (orthotics) or special pads are prescribed to reduce friction and pressure.    Change shoes  If you have corns, your doctor may suggest wearing shoes that have more toe room. This way, buckled joints are less likely to be pinched against the top of the shoe. If you have calluses, wearing a cushioned insole, arch support, or heel counter can help reduce friction.  Visit your doctor  In some cases, your doctor may trim away the outer layers of skin that make up the corn or callus. For a painful corn, medicine may be injected beneath the built-up tissue.  Wear orthotics  Orthotics are specially made to meet the needs of your feet. They cushion calluses or divert pressure away from these problem areas. Worn as directed, orthotics help limit existing problems and prevent new ones from forming.  If you need surgery  If a bone or joint is out of place, certain parts of your foot may be under too much pressure. This can cause severe corns and calluses. In such cases, surgery may be the best way to correct the problem.  Outpatient procedures  In most cases, surgery to improve bone position is an outpatient procedure. Your doctor may cut away excess bone, reposition prominent bones, or even fuse joints. Sometimes tendons or ligaments are cut to reduce tension on a bone or joint. Your doctor will talk with you about the procedure that is best suited to your needs.  Date Last Reviewed: 7/1/2016 2000-2017 [employer]. [address] [place], [place] [id number]. All rights reserved. This information is not intended as a substitute for professional medical care. Always follow your healthcare professional's instructions.           Patient Education     Acute Bacterial Rhinosinusitis (ABRS)    Acute bacterial rhinosinusitis (ABRS) is an infection of your nasal cavity and sinuses. Its caused by bacteria. Acute means that youve had symptoms for less than 4 weeks, but possibly up to 12 weeks.  Understanding your  sinuses  The nasal cavity is the large air-filled space behind your nose. The sinuses are a group of spaces formed by the bones of your face. They connect with your nasal cavity. ABRS causes the tissue lining these spaces to become inflamed. Mucus may not drain normally. This leads to facial pain and other symptoms.  What causes ABRS?  ABRS most often follows an upper respiratory infection caused by a virus. Bacteria then infect the lining of your nasal cavity and sinuses. But you can also get ABRS if you have:    Nasal allergies    Long-term nasal swelling and congestion not caused by allergies    Blockage in the nose  Symptoms of ABRS  The symptoms of ABRS may be different for each person and include:    Nasal congestion or blockage    Pain or pressure in the face    Thick, colored drainage from the nose  Other symptoms may include:    Runny nose    Fluid draining from the nose down the throat (postnasal drip)    Headache    Cough    Pain    Fever  Diagnosing ABRS  ABRS may be diagnosed if youve had an upper respiratory infection like a cold and cough for 10 or more days without improvement or with worsening symptoms. Your healthcare provider will ask about your symptoms and your medical history. The provider will check your vital signs, including your temperature. Youll have a physical exam. The healthcare provider will check your ears, nose, and throat. You likely wont need any tests. If ABRS comes back, you may have a culture or other tests.  Treatment for ABRS  Treatment may include:    Antibiotic medicine. This is for symptoms that last for at least 10 to 14 days.    Nasal corticosteroid medicine. Drops or spray used in the nose can lessen swelling and congestion.    Over-the-counter pain medicine. This is to lessen sinus pain and pressure.    Nasal decongestant medicine. Spray or drops may help to lessen congestion. Do not use them for more than a few days.    Salt wash (saline irrigation). This can help to  loosen mucus.  Possible complications of ABRS  ABRS may come back or become long-term (chronic). In rare cases, ABRS may cause complications such as:     Inflamed tissue around the brain and spinal cord (meningitis)    Inflamed tissue around the eyes (orbital cellulitis)    Inflamed bones around the sinuses (osteitis)  These problems may need to be treated in a hospital with intravenous (IV) antibiotic medicine or surgery.  When to call the healthcare provider  Call your healthcare provider if you have any of the following:    Symptoms that dont get better, or get worse    Symptoms that dont get better after 3 to 5 days on antibiotics    Trouble seeing    Swelling around your eyes    Confusion or trouble staying awake   Date Last Reviewed: 5/1/2017 2000-2017 [employer]. [address] [place] [address], [place] [id number]. All rights reserved. This information is not intended as a substitute for professional medical care. Always follow your healthcare professional's instructions.           Patient Education     Acute Bacterial Rhinosinusitis (ABRS)    Acute bacterial rhinosinusitis (ABRS) is an infection of your nasal cavity and sinuses. Its caused by bacteria. Acute means that youve had symptoms for less than 4 weeks, but possibly up to 12 weeks.  Understanding your sinuses  The nasal cavity is the large air-filled space behind your nose. The sinuses are a group of spaces formed by the bones of your face. They connect with your nasal cavity. ABRS causes the tissue lining these spaces to become inflamed. Mucus may not drain normally. This leads to facial pain and other symptoms.  What causes ABRS?  ABRS most often follows an upper respiratory infection caused by a virus. Bacteria then infect the lining of your nasal cavity and sinuses. But you can also get ABRS if you have:    Nasal allergies    Long-term nasal swelling and congestion not caused by allergies    Blockage in the nose  Symptoms of ABRS  The  symptoms of ABRS may be different for each person and include:    Nasal congestion or blockage    Pain or pressure in the face    Thick, colored drainage from the nose  Other symptoms may include:    Runny nose    Fluid draining from the nose down the throat (postnasal drip)    Headache    Cough    Pain    Fever  Diagnosing ABRS  ABRS may be diagnosed if youve had an upper respiratory infection like a cold and cough for 10 or more days without improvement or with worsening symptoms. Your healthcare provider will ask about your symptoms and your medical history. The provider will check your vital signs, including your temperature. Youll have a physical exam. The healthcare provider will check your ears, nose, and throat. You likely wont need any tests. If ABRS comes back, you may have a culture or other tests.  Treatment for ABRS  Treatment may include:    Antibiotic medicine. This is for symptoms that last for at least 10 to 14 days.    Nasal corticosteroid medicine. Drops or spray used in the nose can lessen swelling and congestion.    Over-the-counter pain medicine. This is to lessen sinus pain and pressure.    Nasal decongestant medicine. Spray or drops may help to lessen congestion. Do not use them for more than a few days.    Salt wash (saline irrigation). This can help to loosen mucus.  Possible complications of ABRS  ABRS may come back or become long-term (chronic). In rare cases, ABRS may cause complications such as:     Inflamed tissue around the brain and spinal cord (meningitis)    Inflamed tissue around the eyes (orbital cellulitis)    Inflamed bones around the sinuses (osteitis)  These problems may need to be treated in a hospital with intravenous (IV) antibiotic medicine or surgery.  When to call the healthcare provider  Call your healthcare provider if you have any of the following:    Symptoms that dont get better, or get worse    Symptoms that dont get better after 3 to 5 days on  antibiotics    Trouble seeing    Swelling around your eyes    Confusion or trouble staying awake   Date Last Reviewed: 5/1/2017 2000-2017 [employer]. [address] [place] [address]. All rights reserved. This information is not intended as a substitute for professional medical care. Always follow your healthcare professional's instructions.           Patient Education     Step-by-Step  Checking Your Blood Sugar    Date Last Reviewed: 5/1/2016 2000-2017 [employer]. [address] [place] [address]. All rights reserved. This information is not intended as a substitute for professional medical care. Always follow your healthcare professional's instructions.           Patient Education     Treating Corns and Calluses  If your corns or calluses are mild, reducing friction may help. Different shoes, moleskin patches, or soft pads may be all the treatment you need. In more severe cases, treating tissue buildup may require your doctors care. Sometimes custom-made shoe inserts (orthotics) or special pads are prescribed to reduce friction and pressure.    Change shoes  If you have corns, your doctor may suggest wearing shoes that have more toe room. This way, buckled joints are less likely to be pinched against the top of the shoe. If you have calluses, wearing a cushioned insole, arch support, or heel counter can help reduce friction.  Visit your doctor  In some cases, your doctor may trim away the outer layers of skin that make up the corn or callus. For a painful corn, medicine may be injected beneath the built-up tissue.  Wear orthotics  Orthotics are specially made to meet the needs of your feet. They cushion calluses or divert pressure away from these problem areas. Worn as directed, orthotics help limit existing problems and prevent new ones from forming.  If you need surgery  If a bone or joint is out of place, certain parts of your foot may be under too much  pressure. This can cause severe corns and calluses. In such cases, surgery may be the best way to correct the problem.  Outpatient procedures  In most cases, surgery to improve bone position is an outpatient procedure. Your doctor may cut away excess bone, reposition prominent bones, or even fuse joints. Sometimes tendons or ligaments are cut to reduce tension on a bone or joint. Your doctor will talk with you about the procedure that is best suited to your needs.  Date Last Reviewed: 7/1/2016 2000-2017 [employer]. [address] [place], [place] [id number]. All rights reserved. This information is not intended as a substitute for professional medical care. Always follow your healthcare professional's instructions.

## 2021-06-18 NOTE — PATIENT INSTRUCTIONS - HE
Patient Instructions by Pili Hernandez CNP at 8/27/2020 12:50 PM     Author: Pili Hernandez CNP Service: -- Author Type: Nurse Practitioner    Filed: 8/27/2020  1:28 PM Encounter Date: 8/27/2020 Status: Addendum    : Pili Hernandez CNP (Nurse Practitioner)    Related Notes: Original Note by Pili Hernandez CNP (Nurse Practitioner) filed at 8/27/2020  1:27 PM       Ordered your glucometer for you.  You should be testing at least 4 times per day.  Take your insulin twice daily as prescribed.      Please start your oral metformin prescription.      Please start your rosuvastatin.    Placed an urgent referral for you to be establishing care with our primary care case management team.  They will call you to set up this appointment.  Please do not cancel this.    I have given you 2 weeks time to kind of get your resume in order, I have written your [place] paperwork to end on 9/11/2020.  All further paperwork will have to go through your new primary care provider.    Monitor your symptoms closely, you need to present to the emergency room if they worsen, (confusion, shortness of breath, chest pain, chest pressure, nausea, vomiting) or any other symptoms that develop.  Patient Education     Using a Blood Sugar Log    You have diabetes. This means your body has trouble controlling blood sugar (glucose). To help manage your diabetes, check your blood sugar level as directed by your healthcare provider. Keep a log of your blood sugar levels to help you track it. Its a simple and easy way to see how well you are controlling your diabetes.  Checking your blood sugar level  Check your blood sugar with a blood glucose meter. First, wash and dry your hands. Next, youll prick the side of your finger with a tiny lancet or with a spring-loaded lancing device to draw a tiny drop of blood onto a test strip.   The strip goes into the meter first. Next a drop of blood is placed on the tip of  the strip. The meter shows your blood sugar level. Your readings should be in your target range as often as possible. This means not too high or too low. Staying in this range helps lower your risk for problems. Your healthcare provider will help you find the target range that is best for you.  Some glucose meters let you use another place on your body to test. But these other places should not be used in some cases as they may not be accurate. Follow the instructions for your glucose meter. And talk with your healthcare provider before doing the test on other places.  Continuous glucose monitoring  Continuous glucose monitoring (CGM) may be an option for checking your blood sugar levels. It tracks your blood sugar level throughout the day and night. This can help you make better choices about food and physical activities, and taking medicines. It can also find trends and patterns that can help your healthcare provider better manage your diabetes. Ask your healthcare provider if CGM is right for you.  Several CGM devices are available. They are approved by the FDA with a prescription from a healthcare provider. It includes a sensor, transmitter, and a  or monitor. The sensor is a small device placed under the skin. It will measure your blood sugar several times a minute. A transmitter sends the information to a . This may be a part of an insulin pump or a separate device.  Your blood sugar will still need to be checked a few times a day with a regular glucose meter to look for accuracy. The sensor under the skin needs to be replaced every 3 to 7 days.   Tracking your readings  Record the readings in your log book or using an online sania each time you check your blood sugar. Your meter may also have a memory feature. Your healthcare provider can check this at your next visit. You may be told by your healthcare provider to check your blood sugar in the morning, at bedtime, and before and after meals. Be  sure to write down all of your numbers. Also use your log to record things that might have affected your blood sugar. Some examples include being sick or dehydrated. It may also include taking certain medicines, being physically active, feeling stressed, or skipping meals.   Lessons learned from your readings  Tracking your blood sugar readings helps you see patterns. This tells you how your actions affect your blood sugar. For instance, you may have higher numbers after eating certain foods. Or you may have lower numbers after exercise. This helps you understand how to stay in your target range.  Sharing your log with your healthcare team  Bring your blood sugar log and glucose meter with you to all of your appointments. This can help your healthcare team make changes to your treatment plan, if needed. This may mean making changes in what you eat, what medicines you take, or how much you exercise.  To learn more  The resources below can help you learn more:    American Diabetes Association, 186.402.5102, www.diabetes.org    [employer], 387.196.7652, www.[employer].org    [facility], 758.445.4828, www.nei.nih.gov    [facility], 830.775.2177, www.hormone.org  Date Last Reviewed: 9/1/2018 2000-2019 [employer]. [address] [place] [address], [place] [address]. All rights reserved. This information is not intended as a substitute for professional medical care. Always follow your healthcare professional's instructions.           Patient Education     Step-by-Step:  Checking Your Blood Sugar    Date Last Reviewed: 11/1/2018 2000-2019 [employer]. [address] [place] [address]. All rights reserved. This information is not intended as a substitute for professional medical care. Always follow your healthcare professional's instructions.           Patient Education     Diabetes with High Blood Sugar  You have been treated for high blood sugar  "(hyperglycemia). This may be because of an infection or other illness, eating too many sweets or starches, or not taking enough insulin.  Home care  Monitor and write down your blood sugar level at least twice a day. Do this before breakfast and before dinner. If you take insulin, record your routine insulin dose as well. Also record any additional doses required based on your sliding scale. Do this for the next 3 to 5 days.  High blood sugar may cause symptoms that you can learn to recognize, such as:    Frequent urination    Thirst    Dizziness    Headache    Shortness of breath    Breath that smells fruity    Nausea or vomiting    Abdominal pain    Drowsiness or loss of consciousness  If you have high-blood-sugar symptoms, use a blood or urine test to find out what your blood sugar level is. If it is above your usual range, use the \"sliding scale\" regular insulin dose prescribed by your healthcare provider. If you were not given a range for your insulin dose, contact your healthcare provider for more advice.  Follow-up care  Follow up with your healthcare provider, or as advised. You may need to meet with your healthcare provider in the next week. You will likely review your blood sugar records together. You may also talk to your provider about adjusting your dose of insulin or other medicine for blood sugar.  When to seek medical advice  Call your healthcare provider right away if these occur:    High blood sugar symptoms (Symptoms are described above.)    Blood sugar over 300 mg/dl If you cant reach your healthcare provider, go to a hospital emergency room or urgent care center  Date Last Reviewed: 6/1/2016 2000-2017 [employer]. [address] [place] [address], [place] [address]. All rights reserved. This information is not intended as a substitute for professional medical care. Always follow your healthcare professional's instructions.                "

## 2021-06-18 NOTE — LETTER
Letter by Teresa Pyle CNP at      Author: Teresa Pyle CNP Service: -- Author Type: --    Filed:  Encounter Date: 2/14/2019 Status: (Other)       February 14, 2019     Patient: Curtis Varghese   YOB: 1960   Date of Visit: 2/14/2019       To Whom it May Concern:    Curtis Varghese was seen in my clinic on 2/14/2019.  Please excuse him for missing work on 2/14/18.    If you have any questions or concerns, please don't hesitate to call.    Sincerely,         Electronically signed by Teresa Pyle CNP

## 2021-06-20 NOTE — LETTER
Letter by Chidi Silva DO at      Author: Chidi Silva DO Service: -- Author Type: --    Filed:  Encounter Date: 3/30/2020 Status: (Other)       2020      Patient:  Curtis Varghese  :  1960      To Whom it May Concern:      It is in my medical opinion that Curtis requires the allowance of special foods, medications, and juices in his work area.    If you have further questions, please do not hesitate to contact my office.      Electronically Signed,      Chidi Silva DO

## 2021-06-21 NOTE — PROGRESS NOTES
[facility] Clinic Note    Patient Name: Curtis Varghese  Patient Age: 58 y.o.  YOB: 1960  MRN: [id number]    Date of visit: 11/19/2018    There is no problem list on file for this patient.    Social History     Social History Narrative     Not on file     No family history on file.  No past surgical history on file.  Outpatient Encounter Medications as of 11/19/2018   Medication Sig Dispense Refill     aspirin 81 MG EC tablet Take 1 tablet (81 mg total) by mouth daily. 150 tablet 2     No facility-administered encounter medications on file as of 11/19/2018.        Chief Complaint:   Chief Complaint   Patient presents with     Annual Exam     Colon Cancer Screening     patient due, orders pending       HPI:   Occupation:factory  Marital status:single  Number of children:no  Living situation:alone  Diet:ok  Exercise:just started  Alcohol use:no  Tobacco use:no  Illicit drug use:no  Depression:no  Last tetanus:?  HIV testing:doesn't want to, has already been tested [facility]  Lipids/glucose:elevted  Blood pressure:ok  Mother/Father/Siblings MI:no  Fam hx colon or prostate cancer:no  Last colonoscopy 1.5 years ago  Last visit to dentist:soon  Optometrist:soon  No urinary symptoms, not sexually active.      Has a history of difficulty with sugars.  Has had elevated cholesterol.      Takes a fiber supplement.          Wt Readings from Last 3 Encounters:   11/19/18 163 lb 2 oz (74 kg)     BP Readings from Last 3 Encounters:   11/19/18 102/74       ROS: Pertinent ros findings in hpi, all other systems negative.  Objective/Physical Exam:     /74 (Patient Site: Left Arm, Patient Position: Sitting, Cuff Size: Adult Regular)   Pulse 78   Ht 6' (1.829 m)   Wt 163 lb 2 oz (74 kg)   SpO2 100%   BMI 22.12 kg/m      Gen: NAD, conversant, appears age, well-kempt  Skin: warm, dry, no rash, pallor cyanosis  HENT: normocephalic atraumatic, MMM, no oral lesions, otorrhea, rhinorrhea.   Eyes: non-icteric,  extra-ocular movements intact, PERRL, conjunctivae not injected. Holding eyes open comfortably, no drainage.  CV: NRRR no m/r/g, no peripheral edema. no JVD.  Resp: CTAB no w/r/r, normal respiratory effort  GI: soft, non-tender, non-distended. No masses.  MSK: no muscle or joint swelling.  Neuro: no dysarthria or gross asymmetry  Psych: full affect, oriented x 3  Lymph: No significant cervical lymphadenopathy  Hematologic: No petechiae or purpura.        Assessment/Plan:  No results found for this or any previous visit (from the past 24 hour(s)).    Wt Readings from Last 3 Encounters:   11/19/18 163 lb 2 oz (74 kg)     BP Readings from Last 3 Encounters:   11/19/18 102/74       PHQ-2 Total Score: 0 (11/19/2018  8:13 AM)    No Data Recorded    bs running 300.  Likely diabetic.  He states that he did not tolerate metformin very well in the past, he is interested in starting insulin if necessary.  We will check an A1c and make decisions based off this.  We discussed diet at length.  We discussed risks and benefits of restarting aspirin, he would like to start this.  We also discussed risks and benefits for prostate cancer screening, he would prefer not to do this at this time.  He declined genitourinary exam.    ICD-10-CM    1. Type 2 diabetes mellitus with complication, unspecified whether long term insulin use (H) E11.8 Glycosylated Hemoglobin A1c     Lipid Cascade     Comprehensive Metabolic Panel     HM1(CBC and Differential)     Thyroid Stimulating Hormone (TSH)     Microalbumin, Random Urine     Ambulatory referral to Diabetes Education Program (Existing Diagnosis)     Ambulatory referral to Endocrinology     HM1 (CBC with Diff)   2. Routine general medical examination at a health care facility Z00.00 Glycosylated Hemoglobin A1c     Lipid Cascade     Comprehensive Metabolic Panel     HM1(CBC and Differential)     Thyroid Stimulating Hormone (TSH)     HM1 (CBC with Diff)     Medications Ordered   Medications      aspirin 81 MG EC tablet     Sig: Take 1 tablet (81 mg total) by mouth daily.     Dispense:  150 tablet     Refill:  2       Patient Instructions   Plant Based Diet Handout    Eat abundant vegetables.    Only eat whole grains.    2-4 fruits/day.    Enjoy at least 2 servings of legumes (beans) or tofu daily.      Avoid animal products such as dairy, eggs and meat. (Replace these with 1,000mcg vitamin B12 and a calcium/vitamin D supplement such as Caltrate+D3 daily.)    Avoid processed foods, sugars and oils.    Cook your own food as much as possible.    Keep a food diary and ask someone else to diet with you.    Drink 8 glasses of water a day.    [place]bard over [employer] Documentary - watch online.        Counseled patient regarding healthy lifestyle including exercise, healthy eating. I recommend seeing optometrist and dentist regularly.    Counseled patient regarding treatments, treatment options, risks and benefits and diagnosis.  The patient was interactive, attentive, verbalized understanding, and we discussed plan.     Nasim Astorga MD

## 2021-06-23 NOTE — PROGRESS NOTES
"[facility]  ENDOCRINOLOGY    Diabetes Note 2019    Curtis Varghese, 1960, [id number]          Reason for visit      1. Type 2 diabetes mellitus with diabetic neuropathy, without long-term current use of insulin (H)        HPI     Curtis Varghese is a very pleasant 58 y.o. old male who presents for follow up.  SUMMARY:  Curtis is here in referral for poorly controlled Type 2 DM.  His current A1c is >14.  He states that \"for the last year and a half he hasn't really been participating [in his own DM management]\". He was diagnosed in .  He reports that when he was first diagnosed, he decided to work on his diet, and was juicing. He states that he was juicing for a week, and that was all that was necessary to get his numbers back to where they needed to be.  He states that [place] was very successful[place] and [place] to live out his days.  He states that he ended up spending all of his wealth taking care of his elderly mother, whom is now . He was unable to work while caring for her. He states that \"he spent his way into debt.\" He was unable to find sufficient employment afterwards, and is now in a job that will likely end within 3-4 weeks.  As suspected, he admits to being Homeless, for about 6 weeks.  He states that he was pushed out of the rental situation with his mother when she passed.  He is now with shelter, and is renting a room in someone's home. He has food insecurity and is eating that which is affordable. He is underweight. He reports that he has significant neuropathy in his feet. They do not keep him up at night.  He is not currently taking anything to manage his DM because it is unaffordable, in spite of being prescribed Lantus. He is against using insulin, because he feels that it only begets more insulin. This is a theory that he has found support for out on the internet. He appears to know enough to make it plausible.  He is convinced that if " he would just return to [facility], that his numbers would return to where they were when he took that approach in 2011.            Blood glucose data:  Unavailable    Past Medical History     Patient Active Problem List   Diagnosis     Type 2 diabetes mellitus with neurologic complication, without long-term current use of insulin (H)        Family History       family history is not on file.    Social History      reports that  has never smoked. he has never used smokeless tobacco. He reports that he does not drink alcohol or use drugs.      Review of Systems     Patient has no polyuria or polydipsia, no chest pain, dyspnea or TIA's, no numbness, tingling or pain in extremities  Remainder negative except as noted in HPI.    Vital Signs     /82 (Patient Site: Right Arm, Patient Position: Sitting, Cuff Size: Adult Regular)   Pulse 84   Ht 6' (1.829 m)   Wt 168 lb 4.8 oz (76.3 kg)   BMI 22.83 kg/m    Wt Readings from Last 3 Encounters:   01/18/19 168 lb 4.8 oz (76.3 kg)   11/19/18 163 lb 2 oz (74 kg)       Physical Exam     Constitutional: Thin, well kempt, dirt stained hands  HENT:  Normocephalic, some missing teeth  Neck: Thyroid normal, No lymph nodes, Supple  Eyes:  PERRL, Conjunctiva pink  Respiratory:  Normal breath sounds, No respiratory distress  Cardiovascular:  Normal heart rate, Normal rhythm, No murmurs  GI:  Bowel sounds normal, Soft, No tenderness  Musculoskeletal:  No gross deformity or lesions, normal dorsalis pedis pulses  Skin: No acanthosis nigricans, lipoatrophy or lipodystrophy  Neurologic:  Alert & oriented x 3, nonfocal  Psychiatric:  Affect, Mood, Insight appropriate  Diabetic foot exam: no ulcers, charcot's or high risk calluses, unable to feel monofilament        Assessment     1. Type 2 diabetes mellitus with diabetic neuropathy, without long-term current use of insulin (H)        Plan     Lot's of listening today.  I am not convinced that there are not some undiagnosed mental health  issues that are not within my scope to diagnose or treat. He appears more amenable to insulin after our long discussion, including how it works on the molecular level - he has done his research. We talked about using Relion R, which is much more affordable.  There is little that he can do at present to change his way of eating, and he admits that juicing would be unattainable in his current situation.  We did not discuss whether he is able to cook or not. We did talk about treatment for neuropathy and this would depend on whether he could afford the gabapentin and tolerated it. He is going to ponder the insulin and whether he can make significant changes in his diet and movement enough to drop his A1c to where it needs to be. Conveyed to him that it will help somewhat, but likely not to be miraculous.  He will f/u with me in 3 months. Time spent with pt today: 40 min with >90% spent in counseling and coordination of care.        Sheron Reno  [facility]in[facility]  1/20/2019  10:45 AM      Lab Results     Hemoglobin A1c   Date Value Ref Range Status   11/19/2018 >14.0 (H) 3.5 - 6.0 % Final     Creatinine   Date Value Ref Range Status   11/19/2018 1.10 0.70 - 1.30 mg/dL Final     Microalbumin, Random Urine   Date Value Ref Range Status   11/19/2018 5.21 (H) 0.00 - 1.99 mg/dL Final       Cholesterol   Date Value Ref Range Status   11/19/2018 316 (H) <=199 mg/dL Final     HDL Cholesterol   Date Value Ref Range Status   11/19/2018 67 >=40 mg/dL Final     LDL Calculated   Date Value Ref Range Status   11/19/2018 223 (H) <=129 mg/dL Final     Triglycerides   Date Value Ref Range Status   11/19/2018 130 <=149 mg/dL Final       Lab Results   Component Value Date    ALT 18 11/19/2018    AST 16 11/19/2018    ALKPHOS 111 11/19/2018    BILITOT 0.5 11/19/2018         Current Medications     Outpatient Medications Prior to Visit   Medication Sig Dispense Refill     aspirin 81 MG EC tablet Take 1 tablet (81 mg total) by mouth daily.  150 tablet 2     insulin glargine (LANTUS SOLOSTAR U-100 INSULIN) 100 unit/mL (3 mL) pen Inject 10 Units under the skin at bedtime Do not mix Lantus with any other insulin.. 5 adj dose pen 0     No facility-administered medications prior to visit.

## 2021-06-24 NOTE — TELEPHONE ENCOUNTER
Medication Question or Clarification  Who is calling: Pharmacy: [facility]  What medication are you calling about?: Veramyst 27.5mcg nasal spray  What dose do you take?: 1 spray each nostril  How often are you taking the medication?: daily  Who prescribed the medication?: Dr Pyle  What is your question/concern?: Fax received from pharmacy, this medication is not available. Pharmacy is asking provider to prescribe an alternative  Pharmacy: [facility]  Bryan to leave a detailed message?: No-speak to pharmacy staff  Site CMT - Please call the pharmacy to obtain any additional needed information.

## 2021-06-24 NOTE — TELEPHONE ENCOUNTER
Reason for Disposition    Sinus congestion (pressure, fullness) present > 10 days    Nasal discharge present > 10 days    SEVERE sinus pain     Primarily when lying down.    Protocols used: SINUS PAIN AND CONGESTION-A-OH

## 2021-06-24 NOTE — TELEPHONE ENCOUNTER
Who is calling:  Patient  Reason for Call:  Checking status of refill of test strips  Date of last appointment with primary care: 11/19/18  Okay to leave a detailed message: Yes    Patient only has two strips left.

## 2021-06-24 NOTE — TELEPHONE ENCOUNTER
"Pt calls to report influenza-like symptoms x 10 days.  \"Sinus pressure primarily.\"  Nasal congestion.  Sinuses \"have been flowing well until now.\"  \"Now just painful.\"  No fevers.    Reports new frontal headache.  Primarily located over L forehead, L sinus cavity, surrounding L eye.  Onset 18 hours ago.  Took OTC Tylenol PM with no relief.  \"Hurts so much I cannot put the L side of my face on the pillow.\"  Pain is \"all in the L eye and L side of my face.\"    Pt is currently driving.  No apparent neuro symptoms.  Pt sees clearly, speaks with clarity of speech and thought.  Pt attributes current symptoms to severe nasal/sinus congestion.  Discussed walk-in-clinic appears appropriate for initial eval at this time.  Pt agrees to do so.    Consuelo Aquino RN BSBA  [facility] RN Triage           "

## 2021-06-24 NOTE — TELEPHONE ENCOUNTER
"RX needs to be \"Contour Next  \" strips ,  Pt has Glucometer- Please send ASAP to pharmacy-  with directions - do not write as directed, need directions & quantity on RX   "

## 2021-06-25 NOTE — TELEPHONE ENCOUNTER
Spoke with pt and relayed Dr. Astorga's message.  Pt understanding but declined appointment at this time.

## 2021-06-25 NOTE — TELEPHONE ENCOUNTER
I have not seen him since November.  He also followed up with endocrine.  He really needs a visit if he wants to change/start meds.

## 2021-06-25 NOTE — TELEPHONE ENCOUNTER
Medication Request  Medication name: Metformin HCL   Pharmacy Name and Location: [facility]   Reason for request: Patient wants to restart the metformin while dieting and exercising   When did you use medication last?:  Several years ago  Patient offered appointment:  patient declined  Okay to leave a detailed message: yes

## 2021-06-27 NOTE — PROGRESS NOTES
"Progress Notes by Prisca Gilbert CNS at 2/14/2019  8:40 AM     Author: Prisca Gilbert CNS Service: -- Author Type: Clinical Nurse Specialist    Filed: 2/14/2019 12:15 PM Encounter Date: 2/14/2019 Status: Attested    : Prisca Gilbert CNS (Clinical Nurse Specialist) Cosigner: Teresa Pyle CNP at 2/14/2019 12:15 PM    Attestation signed by Teresa Pyle CNP at 2/14/2019 12:15 PM    I personally evaluated and examined the patient with Prisca, student nurse practitioner.  I reviewed her note, discussed the findings and the treatment plan.  I agree with Prisca's plan of care.                Assessment:     1. Type 2 diabetes mellitus with other skin complication, with long-term current use of insulin (H)     2. Nasal congestion  sodium chloride (OCEAN) 0.65 % nasal spray    fluticasone (VERAMYST) 27.5 mcg/actuation nasal spray   3. Acute facial pain     4. Acute non-recurrent maxillary sinusitis  amoxicillin-clavulanate (AUGMENTIN) 875-125 mg per tablet   5. Callus of foot  Ambulatory referral to Podiatry          Plan:     Patient presents with concern of left-sided facial pain, headache, nasal congestion.  Differentials include all of the following: acute sinusitis versus bacterial, viral sinusitis, rhinitis without sinusitis, allergic rhinitis, abscessed tooth, or tension headache. Treatment plan included Augmentin due to DMII (poorly controlled hgbA1c last 14), saline nasal spray, and fluticasone. Patient educated regarding acute bacterial sinusitis, management of diabetes, discussed management of DMII at length (in great detail) and management of calluses. Patient is understanding of the plan and he states he will follow-up with his PCP Dr. Causey as well as endocrinology.    Subjective:       58 y.o. male presents for evaluation of left-sided facial pain.  Present for 1.5 weeks.  Pain is also present in the left upper teeth.  He describes it like a, \"tooth " "infection\".  He reports 11 days of headache and increased nasal discharge.  He is also reporting a cough.  He denies ear fullness.  He denies change in smell.  He denies any facial pressure.  He does endorse facial pain.  He denies nausea and vomiting.  He denies pain with eye movement.  He reports he is tried Tylenol PM x2 last night only.  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  He endorses difficulty with sleep due to pain.  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  He denies history of frequent sinus infection or any otitis media.    Diabetes mellitus: Patient reports he cannot afford his Lantus insulin and has not been taking it. He cannot afford the copay. He employed by [employer] although he reports the coverage is not sufficient. He does endorse polydipsia.  He denies polyphagia or polyuria.  He states that he has tried metformin and this caused significant body aches.  He would prefer not to take any therapy for this and rather would like to continue juicing. He declines further laboratory testing due to the cost. He will be loosing his insurance in two weeks. For that reason we highly suggested obtaining labs today. The patient declines despite this.      Callus of the toe: He states this is becoming more more painful.  He is requesting a consult to podiatry and he would like to discuss treatment for this.     The following portions of the patient's history were reviewed and updated as appropriate: allergies, current medications, past family history, past medical history, past social history, past surgical history and problem list.    Review of Systems  Pertinent items are noted in HPI.  A 12 point comprehensive review of systems was negative except as noted.      Objective:      /69   Pulse 97   Temp 98.4  F (36.9  C)   Resp 16   Wt 158 lb (71.7 kg)   SpO2 98%   BMI 21.43 kg/m    General appearance: alert, appears stated age and " cooperative  Head: Normocephalic, without obvious abnormality, atraumatic  Eyes: conjunctivae/corneas clear. PERRL, EOM's intact. Fundi benign.  Ears: normal TM's and external ear canals both ears and Large amount of cerumen in both ears unable to visualize tympanic membrane  Nose: clear discharge, moderate congestion, left turbinate edematous  Throat: abnormal findings: thyroid midline no edema   Neck: no adenopathy, no carotid bruit, no JVD, supple, symmetrical, trachea midline and thyroid not enlarged, symmetric, no tenderness/mass/nodules  Lungs: clear to auscultation bilaterally  Extremities: extremities normal, atraumatic, no cyanosis or edema  Skin: Skin color, texture, turgor normal. No rashes or lesions  Lymph nodes: Cervical, supraclavicular, and axillary nodes normal.  Neurologic: Grossly normal     This note has been dictated using voice recognition software. Any grammatical or context distortions are unintentional and inherent to the software

## 2021-06-29 NOTE — PROGRESS NOTES
Progress Notes by Pili Hernandez CNP at 8/27/2020 12:50 PM     Author: Pili Hernandez CNP Service: -- Author Type: Nurse Practitioner    Filed: 8/27/2020  4:46 PM Encounter Date: 8/27/2020 Status: Addendum    : Pili Hernandez CNP (Nurse Practitioner)    Related Notes: Original Note by Pili Hernandez CNP (Nurse Practitioner) filed at 8/27/2020  4:24 PM       Clinic Note    Assessment:     Assessment and Plan:  1. Type 2 diabetes mellitus with hyperglycemia, without long-term current use of insulin (H): This with the patient with a hemoglobin A1c greater than 14, fasting blood sugar 430 he is at high risk of diabetic ketoacidosis.  Glucometer ordered for the patient today.  Stressed importance of checking his blood sugar 4 times per day, taking his insulin twice daily as directed.  He needs to follow-up with the diabetes educator, he has the phone number to help set up this appointment.  Encourage the patient start his oral metformin, aspirin.  Referral to care management (primary care) made today.  He will likely need closer following than what internal medicine can provide at this time.  The patient is extremely noncompliant, thinks his plan of intermittent fasting and diet changes will make his A1C go down. Encouraged the patient to start proper monitoring of his diabetes and taking his medications as prescribed.   - blood-glucose meter Misc; Use 1 each As Directed 4 (four) times a day for 1 day.  Dispense: 1 each; Refill: 0  - Ambulatory referral to Care Management (Primary Care)  -Accu-Cheks 4 times per day.  -NPH insulin twice daily as prescribed.  -Start metformin.  -Schedule appointment with diabetes educator.  -Monitor your symptoms closely, emergency room if they change or worsen.    2. Mixed hyperlipidemia: LDL is over 200.  He has not started the prescribed rosuvastatin or aspirin.  Encouraged him to start these two medications.    Discussed frankly with  the patient that with his current blood sugar readings, elevated cholesterol, he is at a very high risk of diabetic ketoacidosis, stroke, or heart attack.  Discussed with the patient that he needs to start working on his health.        Patient Instructions   Ordered your glucometer for you.  You should be testing at least 4 times per day.  Take your insulin twice daily as prescribed.      Please start your oral metformin prescription.      Please start your rosuvastatin.    Placed an urgent referral for you to be establishing care with our primary care case management team.  They will call you to set up this appointment.  Please do not cancel this.    I have given you 2 weeks time to kind of get your resume in order, I have written your FMLA paperwork to end on 9/11/2020.  All further paperwork will have to go through your new primary care provider.    Monitor your symptoms closely, you need to present to the emergency room if they worsen, (confusion, shortness of breath, chest pain, chest pressure, nausea, vomiting) or any other symptoms that develop.  Patient Education     Using a Blood Sugar Log    You have diabetes. This means your body has trouble controlling blood sugar (glucose). To help manage your diabetes, check your blood sugar level as directed by your healthcare provider. Keep a log of your blood sugar levels to help you track it. Its a simple and easy way to see how well you are controlling your diabetes.  Checking your blood sugar level  Check your blood sugar with a blood glucose meter. First, wash and dry your hands. Next, youll prick the side of your finger with a tiny lancet or with a spring-loaded lancing device to draw a tiny drop of blood onto a test strip.   The strip goes into the meter first. Next a drop of blood is placed on the tip of the strip. The meter shows your blood sugar level. Your readings should be in your target range as often as possible. This means not too high or too low.  Staying in this range helps lower your risk for problems. Your healthcare provider will help you find the target range that is best for you.  Some glucose meters let you use another place on your body to test. But these other places should not be used in some cases as they may not be accurate. Follow the instructions for your glucose meter. And talk with your healthcare provider before doing the test on other places.  Continuous glucose monitoring  Continuous glucose monitoring (CGM) may be an option for checking your blood sugar levels. It tracks your blood sugar level throughout the day and night. This can help you make better choices about food and physical activities, and taking medicines. It can also find trends and patterns that can help your healthcare provider better manage your diabetes. Ask your healthcare provider if CGM is right for you.  Several CGM devices are available. They are approved by the FDA with a prescription from a healthcare provider. It includes a sensor, transmitter, and a  or monitor. The sensor is a small device placed under the skin. It will measure your blood sugar several times a minute. A transmitter sends the information to a . This may be a part of an insulin pump or a separate device.  Your blood sugar will still need to be checked a few times a day with a regular glucose meter to look for accuracy. The sensor under the skin needs to be replaced every 3 to 7 days.   Tracking your readings  Record the readings in your log book or using an online sania each time you check your blood sugar. Your meter may also have a memory feature. Your healthcare provider can check this at your next visit. You may be told by your healthcare provider to check your blood sugar in the morning, at bedtime, and before and after meals. Be sure to write down all of your numbers. Also use your log to record things that might have affected your blood sugar. Some examples include being sick or  dehydrated. It may also include taking certain medicines, being physically active, feeling stressed, or skipping meals.   Lessons learned from your readings  Tracking your blood sugar readings helps you see patterns. This tells you how your actions affect your blood sugar. For instance, you may have higher numbers after eating certain foods. Or you may have lower numbers after exercise. This helps you understand how to stay in your target range.  Sharing your log with your healthcare team  Bring your blood sugar log and glucose meter with you to all of your appointments. This can help your healthcare team make changes to your treatment plan, if needed. This may mean making changes in what you eat, what medicines you take, or how much you exercise.  To learn more  The resources below can help you learn more:    American Diabetes Association, 357.494.1075, www.diabetes.org    [employer], 572.157.8562, www.[facility].org    [employer] [facility], 903.896.2353, www.nei.nih.gov    [facility], 283.919.7450, www.hormone.org  Date Last Reviewed: 9/1/2018 2000-2019 [employer]. [address] [place] [address]. All rights reserved. This information is not intended as a substitute for professional medical care. Always follow your healthcare professional's instructions.           Patient Education     Step-by-Step:  Checking Your Blood Sugar    Date Last Reviewed: 11/1/2018 2000-2019 [employer]. [address] [place] [address]. All rights reserved. This information is not intended as a substitute for professional medical care. Always follow your healthcare professional's instructions.           Patient Education     Diabetes with High Blood Sugar  You have been treated for high blood sugar (hyperglycemia). This may be because of an infection or other illness, eating too many sweets or starches, or not taking enough insulin.  Home care  Monitor and  "write down your blood sugar level at least twice a day. Do this before breakfast and before dinner. If you take insulin, record your routine insulin dose as well. Also record any additional doses required based on your sliding scale. Do this for the next 3 to 5 days.  High blood sugar may cause symptoms that you can learn to recognize, such as:    Frequent urination    Thirst    Dizziness    Headache    Shortness of breath    Breath that smells fruity    Nausea or vomiting    Abdominal pain    Drowsiness or loss of consciousness  If you have high-blood-sugar symptoms, use a blood or urine test to find out what your blood sugar level is. If it is above your usual range, use the \"sliding scale\" regular insulin dose prescribed by your healthcare provider. If you were not given a range for your insulin dose, contact your healthcare provider for more advice.  Follow-up care  Follow up with your healthcare provider, or as advised. You may need to meet with your healthcare provider in the next week. You will likely review your blood sugar records together. You may also talk to your provider about adjusting your dose of insulin or other medicine for blood sugar.  When to seek medical advice  Call your healthcare provider right away if these occur:    High blood sugar symptoms (Symptoms are described above.)    Blood sugar over 300 mg/dl If you cant reach your healthcare provider, go to a hospital emergency room or urgent care center  Date Last Reviewed: 6/1/2016 2000-2017 [employer]. [address] [place], [place] [id number]. All rights reserved. This information is not intended as a substitute for professional medical care. Always follow your healthcare professional's instructions.             Return if symptoms worsen or fail to improve.         Subjective:      Curtis Varghese is a 59 y.o. male presents today to have FMLA paperwork filled out.    It is noted the patient did see Dr. Chidi Silva on " 8/13/2020.    Per chart review his hemoglobin A1c was greater than 14%, blood sugar was 430, normal kidney function, EGFR.  Normal electrolytes.  Total cholesterol was 290, LDL was 204, HDL was 225.  Liver function was normal.  White count, red count, hemoglobin were normal, and urine showed he had a bladder infection.  He was started on NPH insulin, metformin, and Accu-Cheks.    He reports that he has only taken his insulin once or twice, and has not been checking his blood sugar.  He reports that he does not have a glucometer.  He reports also taking 1 dose of metformin, and decided not to take this as well.    He reports feeling his rosuvastatin but never started this medication, he also never started the aspirin.    He feels that he can cure his diabetes and high cholesterol was diet.  He reports that he did this 1 time prior [place]  He reports doing a series of juicing, greens, salad, veggies in his diet and he was able to get his hemoglobin A1c under normal, and cholesterol back to normal.    The provider was very candid with the patient, stated that with a hemoglobin A1c greater than 14 diet would not cure his diabetes alone.  Encouraged the patient to start Accu-Cheks at least 4 times per day, his insulin twice per day as prescribed, and his metformin.  The patient was adamant that he did not need these medications, and that he needed time off of work to set up his diet regime, and work on his diabetes.    The patient reports that he currently works second shift at a [employer].  He reports that some days he feels so tired that he feels he cannot go to work due to the fatigue.  Discussed with the patient that his fatigue likely is related to his out-of-control blood sugar, and uncontrolled diabetes.    The patient was adamant that he needed 2 weeks off of work to help regulate his symptoms.    Dr. Causey did refer him to care management, primary care and diabetes  education.  He is not follow through on either referral.    Discussed with the patient that he needs intensive primary care management, he should establish care with the care management team, and a primary care provider through that clinic.    Discussed with the patient that the provider will give him his 2 weeks of FMLA time, but no more than this.  He needs to establish care within the primary care, care management team.  He will be much more successful with the resources they have to follow the patient.    He denies current fever, chills, cough, shortness of breath, chest pain, chest pressure, nausea, vomiting, urinary, or bowel symptoms.    The following portions of the patient's history were reviewed and updated as appropriate.    Review of Systems:    Review is otherwise negative except for what is mentioned above.     Social Hx:    Social History     Tobacco Use   Smoking Status Never Smoker   Smokeless Tobacco Never Used         Objective:     Vitals:    08/27/20 1247   BP: 112/68   Patient Site: Right Arm   Patient Position: Sitting   Cuff Size: Adult Large   Pulse: 100   Weight: 153 lb (69.4 kg)       Exam:  General: No apparent distress. Calm. Alert and Oriented X3. Pt behavior is appropriate.  Head:Atraumatic. Normocephalic, non-tender to palpation.  Eyes: PERRLA, No discharge. No strabismus. No nystagmus.  Lungs: Clear to auscultation, normal respiratory effort and rate.   Heart: Regular rate and rhythm, no murmurs, gallops, or rubs. Capillary refill <2 seconds. No edema.   Musculoskeletal: Good ROM with extremities. Able to walk without difficulty.  Neurologic: Interactive, alert, no focal findings.   Skin: Warm, dry. Normal hair pattern. Normal skin turgor.       Patient Active Problem List   Diagnosis   ? Type 2 diabetes mellitus with neurologic complication, without long-term current use of insulin (H)   ? Nasal congestion   ? Peripheral polyneuropathy     Current Outpatient Medications   Medication  "Sig Dispense Refill   ? insulin NPH-insulin regular (NOVOLIN 70-30) 100 unit/mL (70-30) injection Inject 10 Units under the skin 2 (two) times a day before meals. 11.65 Type 2 with hyperglycemia 6 mL 0   ? aspirin 81 MG EC tablet Take 1 tablet (81 mg total) by mouth daily. 150 tablet 2   ? blood glucose test strips Use 1 each As Directed 3 (three) times a day. Dispense brand per patient's insurance at pharmacy discretion. 100 strip 11   ? blood-glucose meter Misc Use 1 each As Directed 4 (four) times a day for 1 day. 1 each 0   ? generic lancets (FINGERSTIX LANCETS) Use 1 each As Directed 2 (two) times a day. Dispense brand per patient's insurance at pharmacy discretion. 60 each 11   ? metFORMIN (GLUCOPHAGE) 500 MG tablet Take 1 tablet (500 mg total) by mouth 2 (two) times a day with meals. 60 tablet 11   ? pen needle, diabetic (UNIFINE PENTIPS) 31 gauge x 3/16\" Ndle Use 1 each As Directed 2 (two) times a day. 100 each 3   ? rosuvastatin (CRESTOR) 10 MG tablet Take 1 tablet (10 mg total) by mouth at bedtime. 30 tablet 11     No current facility-administered medications for this visit.      Pili Hernandez, Adult-Geriatric Nurse Practitioner  [facility] - Internal Medicine Team     8/27/2020              "

## 2021-07-03 NOTE — ADDENDUM NOTE
Addendum Note by Melany Steward RN at 3/12/2019 11:24 AM     Author: Melany Steward RN Service: -- Author Type: Registered Nurse    Filed: 3/12/2019 11:24 AM Encounter Date: 3/9/2019 Status: Signed    : Melany Steward RN (Registered Nurse)    Addended by: MELANY STEWARD on: 3/12/2019 11:24 AM        Modules accepted: Orders

## 2021-07-03 NOTE — ADDENDUM NOTE
Addendum Note by Alyssa Astorga MD at 11/19/2018  8:10 AM     Author: Alyssa Astorga MD Service: -- Author Type: Physician    Filed: 11/19/2018  9:41 AM Encounter Date: 11/19/2018 Status: Signed    : Alyssa Astorga MD (Physician)    Addended by: ALYSSA ASTORGA on: 11/19/2018 09:41 AM        Modules accepted: Orders

## 2022-01-10 ENCOUNTER — NURSE TRIAGE (OUTPATIENT)
Dept: NURSING | Facility: CLINIC | Age: 62
End: 2022-01-10

## 2022-01-10 PROCEDURE — 96365 THER/PROPH/DIAG IV INF INIT: CPT

## 2022-01-10 PROCEDURE — C9803 HOPD COVID-19 SPEC COLLECT: HCPCS

## 2022-01-10 PROCEDURE — 99285 EMERGENCY DEPT VISIT HI MDM: CPT | Mod: 25

## 2022-01-10 PROCEDURE — 96375 TX/PRO/DX INJ NEW DRUG ADDON: CPT

## 2022-01-11 ENCOUNTER — APPOINTMENT (OUTPATIENT)
Dept: GENERAL RADIOLOGY | Facility: CLINIC | Age: 62
DRG: 177 | End: 2022-01-11
Attending: EMERGENCY MEDICINE
Payer: OTHER GOVERNMENT

## 2022-01-11 ENCOUNTER — HOSPITAL ENCOUNTER (INPATIENT)
Facility: CLINIC | Age: 62
LOS: 1 days | Discharge: HOME | DRG: 177 | End: 2022-01-12
Attending: EMERGENCY MEDICINE | Admitting: HOSPITALIST
Payer: OTHER GOVERNMENT

## 2022-01-11 DIAGNOSIS — R73.9 HYPERGLYCEMIA: ICD-10-CM

## 2022-01-11 DIAGNOSIS — U07.1 PNEUMONIA DUE TO 2019 NOVEL CORONAVIRUS: ICD-10-CM

## 2022-01-11 DIAGNOSIS — J12.82 PNEUMONIA DUE TO 2019 NOVEL CORONAVIRUS: ICD-10-CM

## 2022-01-11 LAB
ABO/RH(D): NORMAL
ALBUMIN SERPL-MCNC: 3 G/DL (ref 3.4–5)
ALBUMIN SERPL-MCNC: 3.5 G/DL (ref 3.4–5)
ALP SERPL-CCNC: 76 U/L (ref 40–150)
ALP SERPL-CCNC: 86 U/L (ref 40–150)
ALT SERPL W P-5'-P-CCNC: 21 U/L (ref 0–70)
ALT SERPL W P-5'-P-CCNC: 22 U/L (ref 0–70)
ANION GAP SERPL CALCULATED.3IONS-SCNC: 10 MMOL/L (ref 3–14)
ANION GAP SERPL CALCULATED.3IONS-SCNC: 10 MMOL/L (ref 3–14)
ANION GAP SERPL CALCULATED.3IONS-SCNC: 8 MMOL/L (ref 3–14)
APTT PPP: 28 SECONDS (ref 22–38)
AST SERPL W P-5'-P-CCNC: 16 U/L (ref 0–45)
AST SERPL W P-5'-P-CCNC: 21 U/L (ref 0–45)
BASE EXCESS BLDV CALC-SCNC: -1.6 MMOL/L (ref -7.7–1.9)
BASOPHILS # BLD AUTO: 0 10E3/UL (ref 0–0.2)
BASOPHILS # BLD AUTO: 0 10E3/UL (ref 0–0.2)
BASOPHILS NFR BLD AUTO: 0 %
BASOPHILS NFR BLD AUTO: 0 %
BILIRUB DIRECT SERPL-MCNC: <0.1 MG/DL (ref 0–0.2)
BILIRUB SERPL-MCNC: 0.3 MG/DL (ref 0.2–1.3)
BILIRUB SERPL-MCNC: 0.4 MG/DL (ref 0.2–1.3)
BUN SERPL-MCNC: 17 MG/DL (ref 7–30)
BUN SERPL-MCNC: 18 MG/DL (ref 7–30)
BUN SERPL-MCNC: 19 MG/DL (ref 7–30)
CALCIUM SERPL-MCNC: 8.4 MG/DL (ref 8.5–10.1)
CALCIUM SERPL-MCNC: 9.2 MG/DL (ref 8.5–10.1)
CALCIUM SERPL-MCNC: 9.2 MG/DL (ref 8.5–10.1)
CHLORIDE BLD-SCNC: 97 MMOL/L (ref 94–109)
CHLORIDE BLD-SCNC: 98 MMOL/L (ref 94–109)
CHLORIDE BLD-SCNC: 99 MMOL/L (ref 94–109)
CO2 SERPL-SCNC: 21 MMOL/L (ref 20–32)
CO2 SERPL-SCNC: 24 MMOL/L (ref 20–32)
CO2 SERPL-SCNC: 25 MMOL/L (ref 20–32)
CREAT SERPL-MCNC: 0.81 MG/DL (ref 0.66–1.25)
CREAT SERPL-MCNC: 0.83 MG/DL (ref 0.66–1.25)
CREAT SERPL-MCNC: 1.02 MG/DL (ref 0.66–1.25)
CRP SERPL-MCNC: 69.8 MG/L (ref 0–8)
CRP SERPL-MCNC: 71.6 MG/L (ref 0–8)
D DIMER PPP FEU-MCNC: 0.51 UG/ML FEU (ref 0–0.5)
D DIMER PPP FEU-MCNC: 0.54 UG/ML FEU (ref 0–0.5)
EOSINOPHIL # BLD AUTO: 0 10E3/UL (ref 0–0.7)
EOSINOPHIL # BLD AUTO: 0 10E3/UL (ref 0–0.7)
EOSINOPHIL NFR BLD AUTO: 0 %
EOSINOPHIL NFR BLD AUTO: 0 %
ERYTHROCYTE [DISTWIDTH] IN BLOOD BY AUTOMATED COUNT: 12.8 % (ref 10–15)
ERYTHROCYTE [DISTWIDTH] IN BLOOD BY AUTOMATED COUNT: 12.9 % (ref 10–15)
ERYTHROCYTE [SEDIMENTATION RATE] IN BLOOD BY WESTERGREN METHOD: 48 MM/HR (ref 0–20)
FIBRINOGEN PPP-MCNC: 565 MG/DL (ref 170–490)
FLUAV RNA SPEC QL NAA+PROBE: NEGATIVE
FLUBV RNA RESP QL NAA+PROBE: NEGATIVE
GFR SERPL CREATININE-BSD FRML MDRD: 84 ML/MIN/1.73M2
GFR SERPL CREATININE-BSD FRML MDRD: >90 ML/MIN/1.73M2
GFR SERPL CREATININE-BSD FRML MDRD: >90 ML/MIN/1.73M2
GLUCOSE BLD-MCNC: 316 MG/DL (ref 70–99)
GLUCOSE BLD-MCNC: 355 MG/DL (ref 70–99)
GLUCOSE BLD-MCNC: 481 MG/DL (ref 70–99)
GLUCOSE BLDC GLUCOMTR-MCNC: 335 MG/DL (ref 70–99)
GLUCOSE BLDC GLUCOMTR-MCNC: 406 MG/DL (ref 70–99)
GLUCOSE BLDC GLUCOMTR-MCNC: 414 MG/DL (ref 70–99)
HBA1C MFR BLD: 11.1 % (ref 0–5.6)
HCO3 BLDV-SCNC: 22 MMOL/L (ref 21–28)
HCT VFR BLD AUTO: 36.6 % (ref 40–53)
HCT VFR BLD AUTO: 40 % (ref 40–53)
HGB BLD-MCNC: 12.1 G/DL (ref 13.3–17.7)
HGB BLD-MCNC: 13.5 G/DL (ref 13.3–17.7)
HOLD SPECIMEN: NORMAL
HOLD SPECIMEN: NORMAL
IMM GRANULOCYTES # BLD: 0 10E3/UL
IMM GRANULOCYTES # BLD: 0 10E3/UL
IMM GRANULOCYTES NFR BLD: 0 %
IMM GRANULOCYTES NFR BLD: 0 %
INR PPP: 1.09 (ref 0.85–1.15)
KETONES BLD-SCNC: 2.4 MMOL/L (ref 0–0.6)
LACTATE SERPL-SCNC: 1.4 MMOL/L (ref 0.7–2)
LDH SERPL L TO P-CCNC: 181 U/L (ref 85–227)
LIPASE SERPL-CCNC: 84 U/L (ref 73–393)
LYMPHOCYTES # BLD AUTO: 0.7 10E3/UL (ref 0.8–5.3)
LYMPHOCYTES # BLD AUTO: 0.7 10E3/UL (ref 0.8–5.3)
LYMPHOCYTES NFR BLD AUTO: 27 %
LYMPHOCYTES NFR BLD AUTO: 39 %
MAGNESIUM SERPL-MCNC: 2 MG/DL (ref 1.6–2.3)
MCH RBC QN AUTO: 28.8 PG (ref 26.5–33)
MCH RBC QN AUTO: 29.1 PG (ref 26.5–33)
MCHC RBC AUTO-ENTMCNC: 33.1 G/DL (ref 31.5–36.5)
MCHC RBC AUTO-ENTMCNC: 33.8 G/DL (ref 31.5–36.5)
MCV RBC AUTO: 86 FL (ref 78–100)
MCV RBC AUTO: 87 FL (ref 78–100)
MONOCYTES # BLD AUTO: 0.1 10E3/UL (ref 0–1.3)
MONOCYTES # BLD AUTO: 0.2 10E3/UL (ref 0–1.3)
MONOCYTES NFR BLD AUTO: 6 %
MONOCYTES NFR BLD AUTO: 8 %
NEUTROPHILS # BLD AUTO: 0.9 10E3/UL (ref 1.6–8.3)
NEUTROPHILS # BLD AUTO: 1.6 10E3/UL (ref 1.6–8.3)
NEUTROPHILS NFR BLD AUTO: 55 %
NEUTROPHILS NFR BLD AUTO: 65 %
NRBC # BLD AUTO: 0 10E3/UL
NRBC # BLD AUTO: 0 10E3/UL
NRBC BLD AUTO-RTO: 0 /100
NRBC BLD AUTO-RTO: 0 /100
O2/TOTAL GAS SETTING VFR VENT: 0 %
OXYHGB MFR BLDV: 54 % (ref 70–75)
PCO2 BLDV: 34 MM HG (ref 40–50)
PH BLDV: 7.42 [PH] (ref 7.32–7.43)
PLATELET # BLD AUTO: 218 10E3/UL (ref 150–450)
PLATELET # BLD AUTO: 226 10E3/UL (ref 150–450)
PO2 BLDV: 28 MM HG (ref 25–47)
POTASSIUM BLD-SCNC: 3.9 MMOL/L (ref 3.4–5.3)
POTASSIUM BLD-SCNC: 4.4 MMOL/L (ref 3.4–5.3)
POTASSIUM BLD-SCNC: 4.5 MMOL/L (ref 3.4–5.3)
PROCALCITONIN SERPL-MCNC: <0.05 NG/ML
PROT SERPL-MCNC: 7.1 G/DL (ref 6.8–8.8)
PROT SERPL-MCNC: 8 G/DL (ref 6.8–8.8)
RBC # BLD AUTO: 4.2 10E6/UL (ref 4.4–5.9)
RBC # BLD AUTO: 4.64 10E6/UL (ref 4.4–5.9)
SARS-COV-2 RNA RESP QL NAA+PROBE: POSITIVE
SODIUM SERPL-SCNC: 129 MMOL/L (ref 133–144)
SODIUM SERPL-SCNC: 131 MMOL/L (ref 133–144)
SODIUM SERPL-SCNC: 132 MMOL/L (ref 133–144)
SPECIMEN EXPIRATION DATE: NORMAL
TROPONIN I SERPL HS-MCNC: 5 NG/L
TROPONIN I SERPL HS-MCNC: 7 NG/L
WBC # BLD AUTO: 1.7 10E3/UL (ref 4–11)
WBC # BLD AUTO: 2.5 10E3/UL (ref 4–11)

## 2022-01-11 PROCEDURE — 84155 ASSAY OF PROTEIN SERUM: CPT | Performed by: NURSE PRACTITIONER

## 2022-01-11 PROCEDURE — 83615 LACTATE (LD) (LDH) ENZYME: CPT | Performed by: NURSE PRACTITIONER

## 2022-01-11 PROCEDURE — 99231 SBSQ HOSP IP/OBS SF/LOW 25: CPT | Performed by: NURSE PRACTITIONER

## 2022-01-11 PROCEDURE — 99207 PR APP CREDIT; MD BILLING SHARED VISIT: CPT | Performed by: NURSE PRACTITIONER

## 2022-01-11 PROCEDURE — XW033E5 INTRODUCTION OF REMDESIVIR ANTI-INFECTIVE INTO PERIPHERAL VEIN, PERCUTANEOUS APPROACH, NEW TECHNOLOGY GROUP 5: ICD-10-PCS | Performed by: EMERGENCY MEDICINE

## 2022-01-11 PROCEDURE — 36415 COLL VENOUS BLD VENIPUNCTURE: CPT | Performed by: EMERGENCY MEDICINE

## 2022-01-11 PROCEDURE — 250N000009 HC RX 250: Performed by: EMERGENCY MEDICINE

## 2022-01-11 PROCEDURE — 36415 COLL VENOUS BLD VENIPUNCTURE: CPT | Performed by: NURSE PRACTITIONER

## 2022-01-11 PROCEDURE — 87636 SARSCOV2 & INF A&B AMP PRB: CPT | Performed by: EMERGENCY MEDICINE

## 2022-01-11 PROCEDURE — 250N000011 HC RX IP 250 OP 636: Performed by: EMERGENCY MEDICINE

## 2022-01-11 PROCEDURE — 86901 BLOOD TYPING SEROLOGIC RH(D): CPT | Performed by: NURSE PRACTITIONER

## 2022-01-11 PROCEDURE — 258N000003 HC RX IP 258 OP 636: Performed by: EMERGENCY MEDICINE

## 2022-01-11 PROCEDURE — 250N000011 HC RX IP 250 OP 636: Performed by: NURSE PRACTITIONER

## 2022-01-11 PROCEDURE — 82248 BILIRUBIN DIRECT: CPT | Performed by: EMERGENCY MEDICINE

## 2022-01-11 PROCEDURE — 250N000012 HC RX MED GY IP 250 OP 636 PS 637: Performed by: EMERGENCY MEDICINE

## 2022-01-11 PROCEDURE — 250N000013 HC RX MED GY IP 250 OP 250 PS 637: Performed by: NURSE PRACTITIONER

## 2022-01-11 PROCEDURE — 84484 ASSAY OF TROPONIN QUANT: CPT | Performed by: NURSE PRACTITIONER

## 2022-01-11 PROCEDURE — 258N000003 HC RX IP 258 OP 636: Performed by: NURSE PRACTITIONER

## 2022-01-11 PROCEDURE — 85610 PROTHROMBIN TIME: CPT | Performed by: NURSE PRACTITIONER

## 2022-01-11 PROCEDURE — 82805 BLOOD GASES W/O2 SATURATION: CPT | Performed by: EMERGENCY MEDICINE

## 2022-01-11 PROCEDURE — 85379 FIBRIN DEGRADATION QUANT: CPT | Performed by: NURSE PRACTITIONER

## 2022-01-11 PROCEDURE — 83690 ASSAY OF LIPASE: CPT | Performed by: EMERGENCY MEDICINE

## 2022-01-11 PROCEDURE — 120N000001 HC R&B MED SURG/OB

## 2022-01-11 PROCEDURE — 85730 THROMBOPLASTIN TIME PARTIAL: CPT | Performed by: NURSE PRACTITIONER

## 2022-01-11 PROCEDURE — 80053 COMPREHEN METABOLIC PANEL: CPT | Performed by: EMERGENCY MEDICINE

## 2022-01-11 PROCEDURE — 86140 C-REACTIVE PROTEIN: CPT | Performed by: NURSE PRACTITIONER

## 2022-01-11 PROCEDURE — 82310 ASSAY OF CALCIUM: CPT | Performed by: NURSE PRACTITIONER

## 2022-01-11 PROCEDURE — 86140 C-REACTIVE PROTEIN: CPT | Performed by: EMERGENCY MEDICINE

## 2022-01-11 PROCEDURE — 83036 HEMOGLOBIN GLYCOSYLATED A1C: CPT | Performed by: NURSE PRACTITIONER

## 2022-01-11 PROCEDURE — 99223 1ST HOSP IP/OBS HIGH 75: CPT | Mod: AI | Performed by: HOSPITALIST

## 2022-01-11 PROCEDURE — 85379 FIBRIN DEGRADATION QUANT: CPT | Performed by: EMERGENCY MEDICINE

## 2022-01-11 PROCEDURE — 85025 COMPLETE CBC W/AUTO DIFF WBC: CPT | Performed by: NURSE PRACTITIONER

## 2022-01-11 PROCEDURE — 85652 RBC SED RATE AUTOMATED: CPT | Performed by: EMERGENCY MEDICINE

## 2022-01-11 PROCEDURE — 85025 COMPLETE CBC W/AUTO DIFF WBC: CPT | Performed by: EMERGENCY MEDICINE

## 2022-01-11 PROCEDURE — 83735 ASSAY OF MAGNESIUM: CPT | Performed by: EMERGENCY MEDICINE

## 2022-01-11 PROCEDURE — 71045 X-RAY EXAM CHEST 1 VIEW: CPT

## 2022-01-11 PROCEDURE — 84484 ASSAY OF TROPONIN QUANT: CPT | Performed by: EMERGENCY MEDICINE

## 2022-01-11 PROCEDURE — 83605 ASSAY OF LACTIC ACID: CPT | Performed by: EMERGENCY MEDICINE

## 2022-01-11 PROCEDURE — 82010 KETONE BODYS QUAN: CPT | Performed by: EMERGENCY MEDICINE

## 2022-01-11 PROCEDURE — 250N000012 HC RX MED GY IP 250 OP 636 PS 637: Performed by: NURSE PRACTITIONER

## 2022-01-11 PROCEDURE — 85384 FIBRINOGEN ACTIVITY: CPT | Performed by: NURSE PRACTITIONER

## 2022-01-11 PROCEDURE — 84145 PROCALCITONIN (PCT): CPT | Performed by: EMERGENCY MEDICINE

## 2022-01-11 RX ORDER — GUAIFENESIN/DEXTROMETHORPHAN 100-10MG/5
10 SYRUP ORAL EVERY 4 HOURS PRN
Status: DISCONTINUED | OUTPATIENT
Start: 2022-01-11 | End: 2022-01-12 | Stop reason: HOSPADM

## 2022-01-11 RX ORDER — PROCHLORPERAZINE 25 MG
25 SUPPOSITORY, RECTAL RECTAL EVERY 12 HOURS PRN
Status: DISCONTINUED | OUTPATIENT
Start: 2022-01-11 | End: 2022-01-12 | Stop reason: HOSPADM

## 2022-01-11 RX ORDER — NICOTINE POLACRILEX 4 MG
15-30 LOZENGE BUCCAL
Status: DISCONTINUED | OUTPATIENT
Start: 2022-01-11 | End: 2022-01-12 | Stop reason: HOSPADM

## 2022-01-11 RX ORDER — ACETAMINOPHEN 650 MG/1
650 SUPPOSITORY RECTAL EVERY 6 HOURS PRN
Status: DISCONTINUED | OUTPATIENT
Start: 2022-01-11 | End: 2022-01-12 | Stop reason: HOSPADM

## 2022-01-11 RX ORDER — SODIUM CHLORIDE 9 MG/ML
INJECTION, SOLUTION INTRAVENOUS CONTINUOUS
Status: DISCONTINUED | OUTPATIENT
Start: 2022-01-11 | End: 2022-01-12 | Stop reason: HOSPADM

## 2022-01-11 RX ORDER — ALBUTEROL SULFATE 90 UG/1
2 AEROSOL, METERED RESPIRATORY (INHALATION) 4 TIMES DAILY
Status: DISCONTINUED | OUTPATIENT
Start: 2022-01-11 | End: 2022-01-11

## 2022-01-11 RX ORDER — PREGABALIN 100 MG/1
100 CAPSULE ORAL 3 TIMES DAILY
Status: DISCONTINUED | OUTPATIENT
Start: 2022-01-11 | End: 2022-01-12 | Stop reason: HOSPADM

## 2022-01-11 RX ORDER — DEXTROSE MONOHYDRATE 25 G/50ML
25-50 INJECTION, SOLUTION INTRAVENOUS
Status: DISCONTINUED | OUTPATIENT
Start: 2022-01-11 | End: 2022-01-12 | Stop reason: HOSPADM

## 2022-01-11 RX ORDER — ASPIRIN 81 MG/1
81 TABLET ORAL DAILY
Status: DISCONTINUED | OUTPATIENT
Start: 2022-01-11 | End: 2022-01-12 | Stop reason: HOSPADM

## 2022-01-11 RX ORDER — FAMOTIDINE 20 MG/1
20 TABLET, FILM COATED ORAL 2 TIMES DAILY
Status: DISCONTINUED | OUTPATIENT
Start: 2022-01-11 | End: 2022-01-12 | Stop reason: HOSPADM

## 2022-01-11 RX ORDER — ONDANSETRON 2 MG/ML
4 INJECTION INTRAMUSCULAR; INTRAVENOUS EVERY 6 HOURS PRN
Status: DISCONTINUED | OUTPATIENT
Start: 2022-01-11 | End: 2022-01-12 | Stop reason: HOSPADM

## 2022-01-11 RX ORDER — BENZONATATE 100 MG/1
100 CAPSULE ORAL 3 TIMES DAILY PRN
Status: DISCONTINUED | OUTPATIENT
Start: 2022-01-11 | End: 2022-01-12 | Stop reason: HOSPADM

## 2022-01-11 RX ORDER — DEXAMETHASONE SODIUM PHOSPHATE 10 MG/ML
6 INJECTION, SOLUTION INTRAMUSCULAR; INTRAVENOUS ONCE
Status: COMPLETED | OUTPATIENT
Start: 2022-01-11 | End: 2022-01-11

## 2022-01-11 RX ORDER — CALCIUM CARBONATE 500 MG/1
500 TABLET, CHEWABLE ORAL 3 TIMES DAILY PRN
Status: DISCONTINUED | OUTPATIENT
Start: 2022-01-11 | End: 2022-01-11

## 2022-01-11 RX ORDER — SODIUM CHLORIDE, SODIUM LACTATE, POTASSIUM CHLORIDE, CALCIUM CHLORIDE 600; 310; 30; 20 MG/100ML; MG/100ML; MG/100ML; MG/100ML
INJECTION, SOLUTION INTRAVENOUS CONTINUOUS
Status: DISCONTINUED | OUTPATIENT
Start: 2022-01-11 | End: 2022-01-11

## 2022-01-11 RX ORDER — ONDANSETRON 4 MG/1
4 TABLET, ORALLY DISINTEGRATING ORAL EVERY 6 HOURS PRN
Status: DISCONTINUED | OUTPATIENT
Start: 2022-01-11 | End: 2022-01-12 | Stop reason: HOSPADM

## 2022-01-11 RX ORDER — PREGABALIN 100 MG/1
100 CAPSULE ORAL 3 TIMES DAILY
COMMUNITY

## 2022-01-11 RX ORDER — ALBUTEROL SULFATE 90 UG/1
2 AEROSOL, METERED RESPIRATORY (INHALATION) EVERY 4 HOURS PRN
Status: DISCONTINUED | OUTPATIENT
Start: 2022-01-11 | End: 2022-01-12 | Stop reason: HOSPADM

## 2022-01-11 RX ORDER — PROCHLORPERAZINE MALEATE 10 MG
10 TABLET ORAL EVERY 6 HOURS PRN
Status: DISCONTINUED | OUTPATIENT
Start: 2022-01-11 | End: 2022-01-12 | Stop reason: HOSPADM

## 2022-01-11 RX ORDER — HYDROMORPHONE HCL IN WATER/PF 6 MG/30 ML
0.2 PATIENT CONTROLLED ANALGESIA SYRINGE INTRAVENOUS
Status: DISCONTINUED | OUTPATIENT
Start: 2022-01-11 | End: 2022-01-12 | Stop reason: HOSPADM

## 2022-01-11 RX ORDER — CALCIUM CARBONATE 500 MG/1
500 TABLET, CHEWABLE ORAL EVERY 4 HOURS PRN
Status: DISCONTINUED | OUTPATIENT
Start: 2022-01-11 | End: 2022-01-12

## 2022-01-11 RX ORDER — LIDOCAINE 40 MG/G
CREAM TOPICAL
Status: DISCONTINUED | OUTPATIENT
Start: 2022-01-11 | End: 2022-01-12 | Stop reason: HOSPADM

## 2022-01-11 RX ORDER — ACETAMINOPHEN 325 MG/1
650 TABLET ORAL EVERY 6 HOURS PRN
Status: DISCONTINUED | OUTPATIENT
Start: 2022-01-11 | End: 2022-01-12 | Stop reason: HOSPADM

## 2022-01-11 RX ADMIN — ENOXAPARIN SODIUM 40 MG: 40 INJECTION SUBCUTANEOUS at 10:07

## 2022-01-11 RX ADMIN — INSULIN ASPART 8 UNITS: 100 INJECTION, SOLUTION INTRAVENOUS; SUBCUTANEOUS at 02:32

## 2022-01-11 RX ADMIN — DEXAMETHASONE 6 MG: 2 TABLET ORAL at 12:30

## 2022-01-11 RX ADMIN — CALCIUM CARBONATE (ANTACID) CHEW TAB 500 MG 500 MG: 500 CHEW TAB at 19:15

## 2022-01-11 RX ADMIN — CALCIUM CARBONATE (ANTACID) CHEW TAB 500 MG 500 MG: 500 CHEW TAB at 13:18

## 2022-01-11 RX ADMIN — INSULIN HUMAN 15 UNITS: 100 INJECTION, SUSPENSION SUBCUTANEOUS at 22:49

## 2022-01-11 RX ADMIN — FAMOTIDINE 20 MG: 20 TABLET ORAL at 12:31

## 2022-01-11 RX ADMIN — SODIUM CHLORIDE: 9 INJECTION, SOLUTION INTRAVENOUS at 15:57

## 2022-01-11 RX ADMIN — DEXAMETHASONE SODIUM PHOSPHATE 6 MG: 10 INJECTION, SOLUTION INTRAMUSCULAR; INTRAVENOUS at 02:33

## 2022-01-11 RX ADMIN — REMDESIVIR 200 MG: 100 INJECTION, POWDER, LYOPHILIZED, FOR SOLUTION INTRAVENOUS at 03:04

## 2022-01-11 RX ADMIN — ALBUTEROL SULFATE 2 PUFF: 90 AEROSOL, METERED RESPIRATORY (INHALATION) at 13:18

## 2022-01-11 RX ADMIN — SODIUM CHLORIDE, POTASSIUM CHLORIDE, SODIUM LACTATE AND CALCIUM CHLORIDE: 600; 310; 30; 20 INJECTION, SOLUTION INTRAVENOUS at 10:08

## 2022-01-11 RX ADMIN — FAMOTIDINE 20 MG: 20 TABLET ORAL at 22:16

## 2022-01-11 RX ADMIN — SODIUM CHLORIDE 50 ML: 9 INJECTION, SOLUTION INTRAVENOUS at 03:05

## 2022-01-11 RX ADMIN — PREGABALIN 100 MG: 100 CAPSULE ORAL at 19:15

## 2022-01-11 ASSESSMENT — ENCOUNTER SYMPTOMS
APPETITE CHANGE: 1
WEAKNESS: 1
SHORTNESS OF BREATH: 0
NAUSEA: 1
ABDOMINAL PAIN: 1
COUGH: 1
HEADACHES: 1
BACK PAIN: 1
DIARRHEA: 1
VOMITING: 1
MYALGIAS: 1
FEVER: 0

## 2022-01-11 ASSESSMENT — ACTIVITIES OF DAILY LIVING (ADL)
ADLS_ACUITY_SCORE: 5
ADLS_ACUITY_SCORE: 7
PATIENT_/_FAMILY_COMMUNICATION_STYLE: SPOKEN LANGUAGE (ENGLISH OR BILINGUAL)
DIFFICULTY_EATING/SWALLOWING: NO
ADLS_ACUITY_SCORE: 5
ADLS_ACUITY_SCORE: 5
ADLS_ACUITY_SCORE: 7
ADLS_ACUITY_SCORE: 5
ADLS_ACUITY_SCORE: 5
WEAR_GLASSES_OR_BLIND: NO
ADLS_ACUITY_SCORE: 5
ADLS_ACUITY_SCORE: 5
CONCENTRATING,_REMEMBERING_OR_MAKING_DECISIONS_DIFFICULTY: NO
ADLS_ACUITY_SCORE: 5
WALKING_OR_CLIMBING_STAIRS_DIFFICULTY: NO
ADLS_ACUITY_SCORE: 7
FALL_HISTORY_WITHIN_LAST_SIX_MONTHS: NO
HEARING_DIFFICULTY_OR_DEAF: NO
ADLS_ACUITY_SCORE: 7
ADLS_ACUITY_SCORE: 5
DOING_ERRANDS_INDEPENDENTLY_DIFFICULTY: NO
DIFFICULTY_COMMUNICATING: NO
ADLS_ACUITY_SCORE: 5
DRESSING/BATHING_DIFFICULTY: NO
ADLS_ACUITY_SCORE: 5
ADLS_ACUITY_SCORE: 7
ADLS_ACUITY_SCORE: 5
ADLS_ACUITY_SCORE: 7
TOILETING_ISSUES: NO
ADLS_ACUITY_SCORE: 5

## 2022-01-11 ASSESSMENT — MIFFLIN-ST. JEOR: SCORE: 1636.79

## 2022-01-11 NOTE — ED PROVIDER NOTES
History   Chief Complaint:  Generalized Body Aches and Hyperglycemia       The history is provided by the patient.      Curtis Varghese is a 61 year old male with a history of type II diabetes who presents with myalgias. The patient recently tested positive for COVID on 1/4 and had developed a cough, headaches, and myalgias at that time. Since then, he has also had abdominal pain, diarrhea, nausea and vomiting, as well as chest pain and back pain. He also states that he ran out of his medication, and specifically his insulin, six weeks ago but began taking them again a few days ago. He has not been eating or drinking much recently so he has not been taking his insulin even though he has had high blood glucose readings the last couple of. He denies any leg swelling, fever, and shortness of breath.  Of note, he is not vaccinated against COVID-19.    Review of Systems   Constitutional: Positive for appetite change. Negative for fever.   Respiratory: Positive for cough. Negative for shortness of breath.    Cardiovascular: Positive for chest pain. Negative for leg swelling.   Gastrointestinal: Positive for abdominal pain, diarrhea, nausea and vomiting.   Musculoskeletal: Positive for back pain and myalgias.   Neurological: Positive for weakness and headaches.   All other systems reviewed and are negative.      Allergies:  The patient has no known allergies.     Medications:  Aspirin 81 mg  Metformin  Insulin   Crestor     Past Medical History:     Type II diabetes with neurologic complication  Peripheral polyneuropathy       Past Surgical History:    The patient has no pertinent surgical history.     Family History:    The patient has no known family history.    Social History:  The patient presents to the ED alone.      Physical Exam     Patient Vitals for the past 24 hrs:   BP Temp Temp src Pulse Resp SpO2 Height Weight   01/11/22 0200 (!) 150/83 -- -- 101 -- -- -- --   01/11/22 0130 138/71 -- -- 100 -- -- -- --    01/11/22 0115 -- -- -- 101 17 95 % -- --   01/11/22 0105 -- -- -- 104 -- 97 % -- --   01/11/22 0100 (!) 167/91 -- -- -- -- -- -- --   01/11/22 0021 115/70 99.4  F (37.4  C) Oral (!) 126 20 95 % 1.829 m (6') 79.4 kg (175 lb)       Physical Exam  Nursing note and vitals reviewed.  Constitutional:  Oriented to person, place, and time. Cooperative.  Appears moderately ill.  HENT:   Nose:    Nose normal.   Mouth/Throat:   Face mask is covering.  Eyes:    Conjunctivae normal and EOM are normal.      Pupils are equal, round, and reactive to light.   Neck:    Trachea normal.   Cardiovascular:  Tachycardic rate, regular rhythm, normal heart sounds and normal pulses. No murmur heard.  Pulmonary/Chest:  Somewhat tachypneic with coarse breath sounds present.   Abdominal:   Soft. Normal appearance and bowel sounds are normal.      Mild diffuse tenderness to palpation.      There is no rebound and no CVA tenderness.   Musculoskeletal:  Extremities atraumatic x 4.   Lymphadenopathy:  No cervical adenopathy.   Neurological:   Alert and oriented to person, place, and time. Normal strength.      No cranial nerve deficit or sensory deficit. GCS eye subscore is 4. GCS verbal subscore is 5. GCS motor subscore is 6.   Skin:    Skin is intact. No rash noted.   Psychiatric:   Normal mood and affect.    Emergency Department Course   Imaging:  XR Chest Port 1 View  Left greater than right groundglass and interstitial opacities, may reflect Covid pneumonia in the proper clinical context, recommend follow-up to resolution. No pneumothorax or pleural effusion. Cardiac silhouette within normal limits.   Mildly atherosclerotic aorta.  Report per radiology    Laboratory:  CBC: WBC 2.5 (L), HGB 13.5,    BMP: sodium 132 (L), glucose 355 (H) o/w WNL (Creatinine 1.02)     blood gas venous and oxyhgb: pH: 7.42, PCO2: 34 (L), PO2: 28, Bicarbonate: 22, Oxyhgb: 54 (L), FIO2 0, base excess -1.6   Symptomatic Influenza A/B & SARS-CoV2 (COVID19)  Virus PCR Multiplex: positive for COVID 19 o/w negative    Troponin (Collected 0032): 5  Lactic acid (result time 0145) 1.4     Lipase: 84  Hepatic panel: AWNL    Magnesium: 2.0  Procalcitonin: <0.05    CRP inflammation: 69.8 (H)  ESR rate: 48 (H)    D dimer: 0.54 (H)  Ketone beta-hydroxybutyrate quantitative: 2.4 (HH)    UA with microscopic: Pending    Emergency Department Course:  Reviewed:  I reviewed nursing notes, vitals, past medical history, Care Everywhere and [facility]    Assessments:  0120 I obtained history and examined the patient as noted above.   0226 I rechecked the patient and explained findings.     Consults:  0210 I spoke with Dr. Cuevas, hospitalist, who agreed to accept the patient.     Interventions:  0232 Insulin aspart 8 units subcutaneous  0233 Decadron 6 mg IV    0345 NS 1L IV Bolus   Remdesivir  200 mg in NaCl 0.9% 250 ml IV    Disposition:  The patient was admitted to the hospital under the care of Clifton Cuevas of the hospitalist service.     Impression & Plan     Medical Decision Making:  This is a 61-year-old male came in for further evaluation of mainly back pain in the setting of a positive COVID-19 diagnosis.  He also has diabetes and has not been taking his medications properly.  I proceeded with the above work-up including blood work, COVID and influenza testing, and a chest x-ray.  I was concerned he might be in DKA, however that does not appear to be the case.  He does have an elevated ketone level in his blood, but he is not acidotic.  He also is quite hyperglycemic.  His D-dimer is negative per the age-adjusted range.  His chest x-ray shows what appears to be COVID-pneumonia.  He was provided IV fluids as well as subcutaneous insulin and IV dexamethasone.  We are also providing him remdesivir.  I think it is best that he be admitted to the hospital for further evaluation and management.  I subsequently spoke with Clifton Cuevas, from the hospitalist service, who will  be admitting him.    Diagnosis:    ICD-10-CM    1. Pneumonia due to 2019 novel coronavirus  U07.1     J12.82    2. Hyperglycemia  R73.9        Scribe Disclosure:  I, Cristina Stoll, am serving as a scribe at 12:58 AM on 1/11/2022 to document services personally performed by Boubacar Verma MD based on my observations and the provider's statements to me.              Boubacar Verma MD  01/11/22 0509

## 2022-01-11 NOTE — H&P
[facility]    History and Physical - Hospitalist Service       Date of Admission:  1/11/2022    Assessment & Plan      Curtis Varghese is a 61 year old male with a past medical history significant for DM2 and neuropathy admitted on 1/11/2022 for COVID-19 infection. Patient is unvaccinated, tested positive on 01/04 developed a cough along with headaches and myalgias. Patient has worsened since his diagnosis with increasing fatigue, pain, now with nausea/vomiting/diarrhea and also a decrease in appetite and inability to eat and drink. Patient endorses chest pain and back pain with coughing. Patient has been unable to take his home insulin or keep down his metformin and was noted to be hyperglycemic on presentation to the ED.     Confirmed COVID-19 infection  Viral Pneumonia       Symptom Onset 01/04/2022   Date of 1st Positive Test 01/04/2022   Vaccination Status Declines Vaccine       - COVID-19 special precautions, continuous pulse-ox  - Oxygen: titrate to keep SpO2 between 90-96%  - Labs: daily COVID labs ordered (CBC, creatinine, retic count, LDH, INR/PT, D-dimer, fibrinogen, troponin, CRP)   - Imaging: no additional imaging needed at this time  - Breathing treatments: albuterol inhaler four times a day scheduled and PRN; avoid nebulizers in favor of MDIs   - IV fluids: ordered at a rate of 75 mL/hr; hydrate cautiously to avoid worsening respiratory status with volume overload.  - Antibiotics: not indicated   - COVID-Focused Medications: Dexamethasone 6 mg x 10 days or until hospital discharge, started on 01/11/2022 and Remdesivir x 5 days or until hospital discharge, started on 01/11/2022  - DVT Prophylaxis: at high risk of thrombotic complications due to COVID-19 (DDimer = 0.54 ug/mL FEU (Ref range: 0.00 - 0.50 ug/mL FEU) ).          - PROPHYLACTIC dosing: lovenox 40mg daily        - consider anticoag on discharge for 30 days & until return to normal mobility    DM2  -- sliding scale  insulin while inpatient  -- hypoglycemia order set  -- carb count by nursing  -- glucose montoring  -- hemoglobin A1C in AM       Diet:   mod carb diet  DVT Prophylaxis: Enoxaparin (Lovenox) SQ  Palmer Catheter: Not present  Central Lines: None  Code Status:   Full Code    Clinically Significant Risk Factors Present on Admission         # Hyponatremia: Na = 132 mmol/L (Ref range: 133 - 144 mmol/L) on admission, will monitor as appropriate      # Platelet Defect: home medication list includes an antiplatelet medication       Disposition Plan   Expected Discharge: 01/13/2022   Anticipated discharge location:  Awaiting care coordination huddle  Delays: none           The patient's care was discussed with the Attending Physician, Dr. Chandler Thomas, Bedside Nurse and Patient.    Clifton Cuevas NP  [facility]  Securely message with the Vocera Web Console (learn more here)  Text page via [facility] Paging/Directory    ______________________________________________________________________    Chief Complaint   N/v/d, cough, chest pain    History is obtained from the patient, electronic health record and emergency department physician    History of Present Illness   Curtis Varghese is a 61 year old male with a past medical history significant for DM2 and neuropathy admitted on 1/11/2022 for COVID-19 infection. Patient is unvaccinated, tested positive on 01/04 developed a cough along with headaches and myalgias. Patient has worsened since his diagnosis with increasing fatigue, pain, now with nausea/vomiting/diarrhea and also a decrease in appetite and inability to eat and drink. Patient endorses chest pain and back pain with coughing. Patient has been unable to take his home insulin or keep down his metformin and was noted to be hyperglycemic on presentation to the ED.     Review of Systems    The 10 point Review of Systems is negative other than noted in the HPI or here.     Past Medical History   "  I have reviewed this patient's medical history and updated it with pertinent information if needed.   Past Medical History:   Diagnosis Date     Acute facial pain 2/14/2019     Type 2 diabetes mellitus with neurologic complication, without long-term current use of insulin (H) 1/20/2019       Past Surgical History   I have reviewed this patient's surgical history and updated it with pertinent information if needed.  No past surgical history on file.    Social History   I have reviewed this patient's social history and updated it with pertinent information if needed.  Social History     Tobacco Use     Smoking status: Never Smoker     Smokeless tobacco: Never Used   Substance Use Topics     Alcohol use: No     Drug use: No       Family History   No significant family history, including no history of: CAD, CVA, Cancer, HTN.    Prior to Admission Medications   Prior to Admission Medications   Prescriptions Last Dose Informant Patient Reported? Taking?   aspirin 81 MG EC tablet   No No   Sig: [ASPIRIN 81 MG EC TABLET] Take 1 tablet (81 mg total) by mouth daily.   blood glucose test strips   No No   Sig: [BLOOD GLUCOSE TEST STRIPS] Use 1 each As Directed 3 (three) times a day. Dispense brand per patient's insurance at pharmacy discretion.   generic lancets (FINGERSTIX LANCETS)   No No   Sig: [GENERIC LANCETS (FINGERSTIX LANCETS)] Use 1 each As Directed 2 (two) times a day. Dispense brand per patient's insurance at pharmacy discretion.   insulin NPH-insulin regular (NOVOLIN 70-30) 100 unit/mL (70-30) injection   No No   Sig: [INSULIN NPH-INSULIN REGULAR (NOVOLIN 70-30) 100 UNIT/ML (70-30) INJECTION] Inject 10 Units under the skin 2 (two) times a day before meals. 11.65 Type 2 with hyperglycemia   metFORMIN (GLUCOPHAGE) 500 MG tablet   No No   Sig: [METFORMIN (GLUCOPHAGE) 500 MG TABLET] Take 1 tablet (500 mg total) by mouth 2 (two) times a day with meals.   pen needle, diabetic (UNIFINE PENTIPS) 31 gauge x 3/16\" Ndle   No " "No   Sig: [PEN NEEDLE, DIABETIC (UNIFINE PENTIPS) 31 GAUGE X 3/16\" NDLE] Use 1 each As Directed 2 (two) times a day.   rosuvastatin (CRESTOR) 10 MG tablet   No No   Sig: [ROSUVASTATIN (CRESTOR) 10 MG TABLET] Take 1 tablet (10 mg total) by mouth at bedtime.      Facility-Administered Medications: None     Allergies   No Known Allergies    Physical Exam   Vital Signs: Temp: 99.4  F (37.4  C) Temp src: Oral BP: (!) 150/83 Pulse: 101   Resp: 17 SpO2: 95 % O2 Device: None (Room air)    Weight: 175 lbs 0 oz    Physical Exam  Constitutional:       Appearance: He is ill-appearing.   HENT:      Head: Normocephalic and atraumatic.      Nose: Nose normal.      Mouth/Throat:      Mouth: Mucous membranes are dry.   Eyes:      Extraocular Movements: Extraocular movements intact.      Pupils: Pupils are equal, round, and reactive to light.   Cardiovascular:      Rate and Rhythm: Regular rhythm. Tachycardia present.   Pulmonary:      Effort: Pulmonary effort is normal. No respiratory distress.   Abdominal:      General: Abdomen is flat. There is no distension.   Musculoskeletal:      Cervical back: Normal range of motion.      Right lower leg: No edema.      Left lower leg: No edema.   Skin:     General: Skin is dry.      Capillary Refill: Capillary refill takes 2 to 3 seconds.      Coloration: Skin is pale.   Neurological:      General: No focal deficit present.      Mental Status: He is alert and oriented to person, place, and time.      Motor: Weakness and tremor present.   Psychiatric:         Mood and Affect: Mood normal.         Behavior: Behavior normal.         Data   Data reviewed today: I reviewed all medications, new labs and imaging results over the last 24 hours. I personally reviewed the chest x-ray image(s) showing bilateral infiltrates, neg for pneumo or pleural.    Recent Labs   Lab 01/11/22  0032   WBC 2.5*   HGB 13.5   MCV 86      *   POTASSIUM 4.5   CHLORIDE 97   CO2 25   BUN 18   CR 1.02   ANIONGAP " 10   MIGEL 9.2   *   ALBUMIN 3.5   PROTTOTAL 8.0   BILITOTAL 0.4   ALKPHOS 86   ALT 22   AST 21   LIPASE 84

## 2022-01-11 NOTE — PROGRESS NOTES
Admission    Patient arrives to room 617 via cart from ED.  Care plan note: Reviewed plan of care and oriented to room. IV infusion started with LR at 75 ml/hr. Gave instructions on IS.     Inpatient nursing criteria listed below were met:    Did you put disposition on whiteboard and in sticky note: Yes  Full skin assessment done (add LDA if skin issue present) :Yes  Isolation education started/completed Yes  Patient allergies verified with patient: Yes  Fall Risk? (Care plan updated, Education given and documented) NA  Primary Care Plan initiated: Yes  Home medications documented in belongings flowsheet: NA  Patient belongings documented in belongings flowsheet: NA  Reminder note (belongings/ medications) placed in discharge instructions:NA  Admission profile/ required documentation complete: NA  If patient is a 72 hour hold/Commitment are belongings removed from room and locked up? NA

## 2022-01-11 NOTE — PROGRESS NOTES
Paged Dr. Mayers about patient wanting unlimited supply of tums in his room with him. Asked her to please talk with him about reasoning behind this.  Paged back she will be explaining this to the patient at some point.

## 2022-01-11 NOTE — ED TRIAGE NOTES
"Patient tested positive for Covid last Tuesday, arrived today via EMS with complaints of generalized body aches, specific \"kidney pain, and elevated blood sugar. Patient is diabetic, says he ran out of medication about 6 weeks ago, began taking his medications a couple days ago. Patient says he has not eaten anything for a couple of days so he again has not had insulin even though his BG was over 400.  "

## 2022-01-11 NOTE — PROGRESS NOTES
RECEIVING UNIT ED HANDOFF REVIEW    ED Nurse Handoff Report was reviewed by: Theodora Crandall RN on January 11, 2022 at 8:49 AM

## 2022-01-11 NOTE — PROGRESS NOTES
[facility]    Medicine Progress Note - Hospitalist Service       Date of Admission:  1/11/2022    Assessment & Plan              Curtis Varghese is a 61 year old male with a past medical history significant for DM2 and neuropathy admitted on 1/11/2022 for COVID-19 infection.      developed a cough along with headaches and myalgias. Patient has worsened since his diagnosis with increasing fatigue, pain, now with nausea/vomiting/diarrhea and also a decrease in appetite and inability to eat and drink. Patient endorses chest pain and back pain with coughing. Patient has been unable to take his home insulin or keep down his metformin and was noted to be hyperglycemic on presentation to the ED.     Confirmed COVID-19 infection  developed a cough along with headaches and myalgias. Patient has worsened since his diagnosis with increasing fatigue, pain, now with nausea/vomiting/diarrhea and also a decrease in appetite and inability to eat and drink. Patient endorses chest pain and back pain with coughing.   Viral Pneumonia        Symptom Onset 01/04/2022   Date of 1st Positive Test 01/04/2022   Vaccination Status Declines Vaccine       - COVID-19 special precautions, continuous pulse-ox  - Oxygen: titrate to keep SpO2 between 90-96%, now on RA  - Labs: daily COVID labs ordered (CBC, creatinine, retic count, LDH, INR/PT, D-dimer, fibrinogen, troponin, CRP)   - Imaging: Shows left more than right infiltrates on chest x-ray on the day of admission no additional imaging needed  - Breathing treatments: albuterol inhaler four times a day PRN; avoid nebulizers in favor of MDIs   - IV fluids: ordered at a rate of 75 mL/hr; hydrate cautiously to avoid worsening respiratory status with volume overload but taking minimal po  - Antibiotics: not indicated   - COVID-Focused Medications: Dexamethasone 6 mg x 10 days or until hospital discharge, started on 01/11/2022 and Remdesivir x 5 days or until hospital  discharge, started on 01/11/2022  - DVT Prophylaxis: at high risk of thrombotic complications due to COVID-19 (DDimer = 0.54 ug/mL FEU (Ref range: 0.00 - 0.50 ug/mL FEU) ).          - PROPHYLACTIC dosing: lovenox 40mg daily        - consider anticoag on discharge for 30 days & until return to normal mobility  -Vaccination, patient declined to state why he has not yet been vaccinated, I encourage patient to get vaccinated once recovered from this acute illness    DM2 poorly controlled at baseline with A1c of 11.1%, and currently hyperglycemic likely secondary to steroids PTA regimen includes 70/30 insulin 17 units in the a.m. and 15 units in the p.m.  Diabetic neuropathy  -Hold metformin while inpatient  -Continue sliding scale insulin before meals and at bedtime  -- hypoglycemia order set  -- carb count by nursing  -Resume basal insulin with NPH component only of 70/30, will do 12 units in the morning and 10 units in the evening  -Resume PTA aspirin    Hypertensive blood pressure readings while in hospital, clinic blood pressures from [facility] from 2021 are in normotensive range 112-125 systolic  - if antihypertensive regimen needs to be started during the hospitalization would favor ACE    Dyspepsia  - As needed Tums and Pepcid    Hyponatremia mild, present on admission, likely secondary to primary lung pathology with COVID and nausea, probably SIADH  -Change IV fluids to normal saline  -BMP in am    Anemia, acute, normocytic, may be related to hemodilution and/or phlebotomy, no overt blood loss seen  - Recheck CBC in a.m.       Diet: Moderate Consistent Carb (60 g CHO per Meal) Diet    DVT Prophylaxis: Enoxaparin (Lovenox) SQ  Palmer Catheter: Not present  Central Lines: None  Code Status: Full Code      Disposition Plan   Expected Discharge: 1-2 days  Anticipated discharge location:  Awaiting care coordination huddle  Delays: none foreseen       The patient's care was discussed with the Attending Physician,   NORA Mulligan and Bedside Nurse.    ANNA Singh [facility]  Hospitalist Service  [facility]  Securely message with the [employer] Web Console (learn more here)  Text page via [employer] Paging/Directory    Total time was 15 minutes of which 10 minutes was face to face time remainder spent in counseling & coordination of care    Clinically Significant Risk Factors Present on Admission         # Hyponatremia: Na = 129 mmol/L (Ref range: 133 - 144 mmol/L) on admission, will monitor as appropriate      # Platelet Defect: home medication list includes an antiplatelet medication   _____________________________________________________________________    Interval History   Generally feels less body aches today not taking much orally yet has been ill for 8 days with anorexia and malaise for at least the last 24 hours has not been taking his insulin and metformin.He is requesting something for upset stomach.     Data reviewed today: I reviewed all medications, new labs and imaging results over the last 24 hours. I personally reviewed no images or EKG's today.    Physical Exam   Vital Signs: Temp: 98  F (36.7  C) Temp src: Oral BP: (!) 140/84 Pulse: 58   Resp: 18 SpO2: 95 % O2 Device: None (Room air)    Weight: 175 lbs 0 oz    Constitutional: vs as per EMR  General:  adult pt lying in bed without acute distress  Neuro: +follows commands wiggle toes and show 2 fingers bilat, face symmetric, tongue midline, speech fluent  Eyes pupils equal round 3mm briskly reactive bilat, sclera nonicteric, noninjected, conjunctiva pink,  Head, ENT & mouth: NC/AT,  mouth moist oral mucosa  Neck: supple  CV S1S2  resp: CTAB upper and lower lobes, speaking in full sentences, respiratory 18  gi:normoactive bowel sounds, soft, nontender, nondisteded  Ext: no edema  Skin: no rashes on exposed skin  Lymph: Defer  Musculoskeletal no bony joint deformities      Data   Recent Labs   Lab 01/11/22  1205 01/11/22  1001 01/11/22  0812  01/11/22  0032   WBC  --  1.7*  --  2.5*   HGB  --  12.1*  --  13.5   MCV  --  87  --  86   PLT  --  218  --  226   INR  --  1.09  --   --    NA  --  129*  --  132*   POTASSIUM  --  3.9  --  4.5   CHLORIDE  --  98  --  97   CO2  --  21  --  25   BUN  --  19  --  18   CR  --  0.83  --  1.02   ANIONGAP  --  10  --  10   MIGEL  --  8.4*  --  9.2   * 481* 335* 355*   ALBUMIN  --  3.0*  --  3.5   PROTTOTAL  --  7.1  --  8.0   BILITOTAL  --  0.3  --  0.4   ALKPHOS  --  76  --  86   ALT  --  21  --  22   AST  --  16  --  21   LIPASE  --   --   --  84     Recent Results (from the past 24 hour(s))   XR Chest Port 1 View    Narrative    EXAM: XR CHEST PORT 1 VIEW  LOCATION: [facility]  DATE/TIME: 1/11/2022 1:48 AM    INDICATION: cough, covid  COMPARISON: None.      Impression    IMPRESSION: Left greater than right groundglass and interstitial opacities, may reflect Covid pneumonia in the proper clinical context, recommend follow-up to resolution. No pneumothorax or pleural effusion. Cardiac silhouette within normal limits.   Mildly atherosclerotic aorta.

## 2022-01-11 NOTE — PROGRESS NOTES
"Pt very upset that he is unable to get an unlimited amount of Tums. Dr and writer spoke with nurse about the protocols here and explained them. He does not want to believe that these are real rules. Pt will not let writer go in and hang new fluids or give him anymore medications until he gets more antacids.     Writer reapproached after patient relations spoke with him. He stated that no other [place] has the ruling of no antacids in the room with the patient. Patient allowed RN to hang new fluids but refused any other medications until he gets his next dose of tums. RN told patient he was allowed to leave AMA if he so chose to and his response was, \" you would love that wouldn't you. I am not going anywhere that is what you want.\" Patient requested to speak with the Dr. RN told patient the DrCamila Name and mispronounced it and patient proceeded to call RN an idiot and then RN stated to please not call her that and he called RN a bitch instead. RN proceeded to let the patient know she would be back when his next medications are due and if he needed anything to use his call light. Pt said no point I will just get someone useless like you. RN left the room and told the charge nurse what had happened.   "

## 2022-01-11 NOTE — TELEPHONE ENCOUNTER
"Curtis is calling about pain - to his back and \"all around my guts\"  He wants to know what he can do about his pain.  He has taken Acetaminophen    He is Covid+ via Home Test    Symptoms started Tue, 1/4/22  - Pain around sides and back; Pain is above level of hips - Rated 6-7/10  - Decreased appetite  - Vomiting - 2 episodes  - Cough  - Fever  - Chills   - Sore throat - intermittent    He reports increased urinary frequency for a couple weeks  He has Type 2 DM    He has recently lost his job - he reports that this was due to problems with neuropathy in his feet.    ER advised or PCP triage  Declined paging on-call provider    He wants to see if he could go somewhere tomorrow  Pt sounds very uncomfortable.  He states that he feels \"shaky\"    ER advised  Unsure if he will seek care.    COVID 19 Nurse Triage Plan/Patient Instructions    Please be aware that novel coronavirus (COVID-19) may be circulating in the community. If you develop symptoms such as fever, cough, or SOB or if you have concerns about the presence of another infection including coronavirus (COVID-19), please contact your health care provider or visit [email or web address].     Disposition/Instructions    ED Visit recommended. Follow protocol based instructions.     Bring Your Own Device:  Please also bring your smart device(s) (smart phones, tablets, laptops) and their charging cables for your personal use and to communicate with your care team during your visit.    Thank you for taking steps to prevent the spread of this virus.  o Limit your contact with others.  o Wear a simple mask to cover your cough.  o Wash your hands well and often.    Resources    [facility]: About COVID-19: [email or web address]    CDC: What to Do If You're Sick: www.cdc.gov/coronavirus/2019-ncov/about/steps-when-sick.html    CDC: Ending Home Isolation: www.cdc.gov/coronavirus/2019-ncov/hcp/disposition-in-home-patients.html     CDC: Caring for " Someone: www.cdc.gov/coronavirus/2019-ncov/if-you-are-sick/care-for-someone.html     [facility]: Interim Guidance for Hospital Discharge to Home: www.health.[facility].[place]/diseases/coronavirus/hcp/hospdischarge.pdf    [facility] clinical trials (COVID-19 research studies): [email or web address]     Below are the COVID-19 hotlines at the [place] Department of Health ([facility]). Interpreters are available.   o For health questions: Call 377-449-9944 or 1-937.792.5403 (7 a.m. to 7 p.m.)  o For questions about schools and childcare: Call 890-488-1197 or 1-201.775.3750 (7 a.m. to 7 p.m.)     Dotty Gallegos RN  [facility]      Reason for Disposition    Vomiting    Additional Information    Negative: Passed out (i.e., lost consciousness, collapsed and was not responding)    Negative: Shock suspected (e.g., cold/pale/clammy skin, too weak to stand, low BP, rapid pulse)    Negative: Difficult to awaken or acting confused (e.g., disoriented, slurred speech)    Negative: Sounds like a life-threatening emergency to the triager    Negative: [1] SEVERE pain (e.g., excruciating, scale 8-10) AND [2] present > 1 hour    Negative: [1] SEVERE pain (e.g., excruciating, scale 8-10) AND [2] not improved after pain medicine    Negative: [1] Sudden onset of severe flank pain AND [2] age > 60    Negative: [1] Abdominal pain AND [2] age > 60    Negative: [1] Unable to urinate (or only a few drops) > 4 hours AND     [2] bladder feels very full (e.g., palpable bladder or strong urge to urinate)    Protocols used: FLANK PAIN-A-AH

## 2022-01-11 NOTE — ED NOTES
[facility]  ED Nurse Handoff Report    ED Chief complaint: Generalized Body Aches and Hyperglycemia      ED Diagnosis:   Final diagnoses:   Pneumonia due to 2019 novel coronavirus   Hyperglycemia       Code Status: Full Code    Allergies: No Known Allergies    Patient Story:   61 year old male with a history of type II diabetes who presents with myalgias. The patient recently tested positive for COVID on 1/4 and had developed a cough, headaches, and myalgias at that time. Since then, he has also had abdominal pain, diarrhea, nausea and vomiting, as well as chest pain and back pain. He also states that he ran out of his medication, and specifically his insulin, six weeks ago but began taking them again a few days ago. He has not been eating or drinking much recently so he has not been taking his insulin even though he has had high blood glucose readings     Focused Assessment:    Review of Systems   Constitutional: Positive for appetite change. Negative for fever.   Respiratory: Positive for cough. Negative for shortness of breath.    Cardiovascular: Positive for chest pain. Negative for leg swelling.   Gastrointestinal: Positive for abdominal pain, diarrhea, nausea and vomiting.   Musculoskeletal: Positive for back pain and myalgias.   Neurological: Positive for weakness and headaches.   All other systems reviewed and are negative.    Labs Ordered and Resulted from Time of ED Arrival to Time of ED Departure   BASIC METABOLIC PANEL - Abnormal       Result Value    Sodium 132 (*)     Potassium 4.5      Chloride 97      Carbon Dioxide (CO2) 25      Anion Gap 10      Urea Nitrogen 18      Creatinine 1.02      Calcium 9.2      Glucose 355 (*)     GFR Estimate 84     CBC WITH PLATELETS AND DIFFERENTIAL - Abnormal    WBC Count 2.5 (*)     RBC Count 4.64      Hemoglobin 13.5      Hematocrit 40.0      MCV 86      MCH 29.1      MCHC 33.8      RDW 12.9      Platelet Count 226      % Neutrophils 65      %  Lymphocytes 27      % Monocytes 8      % Eosinophils 0      % Basophils 0      % Immature Granulocytes 0      NRBCs per 100 WBC 0      Absolute Neutrophils 1.6      Absolute Lymphocytes 0.7 (*)     Absolute Monocytes 0.2      Absolute Eosinophils 0.0      Absolute Basophils 0.0      Absolute Immature Granulocytes 0.0      Absolute NRBCs 0.0     INFLUENZA A/B & SARS-COV2 PCR MULTIPLEX - Abnormal    Influenza A PCR Negative      Influenza B PCR Negative      SARS CoV2 PCR Positive (*)    BLOOD GAS VENOUS WITH OXYHEMOGLOBIN - Abnormal    pH Venous 7.42      pCO2 Venous 34 (*)     pO2 Venous 28      Bicarbonate Venous 22      FIO2 0      Oxyhemoglobin Venous 54 (*)     Base Excess/Deficit (+/-) -1.6     CRP INFLAMMATION - Abnormal    CRP Inflammation 69.8 (*)    ERYTHROCYTE SEDIMENTATION RATE AUTO - Abnormal    Erythrocyte Sedimentation Rate 48 (*)    KETONE BETA-HYDROXYBUTYRATE QUANTITATIVE, RAPID - Abnormal    Ketone (Beta-Hydroxybutyrate) Quantitative 2.4 (*)    D DIMER QUANTITATIVE - Abnormal    D-Dimer Quantitative 0.54 (*)    TROPONIN I - Normal    Troponin I High Sensitivity 5     MAGNESIUM - Normal    Magnesium 2.0     LIPASE - Normal    Lipase 84     HEPATIC FUNCTION PANEL - Normal    Bilirubin Total 0.4      Bilirubin Direct <0.1      Protein Total 8.0      Albumin 3.5      Alkaline Phosphatase 86      AST 21      ALT 22     PROCALCITONIN - Normal    Procalcitonin <0.05     LACTIC ACID WHOLE BLOOD - Normal    Lactic Acid 1.4     GLUCOSE MONITOR NURSING POCT   ROUTINE UA WITH MICROSCOPIC REFLEX TO CULTURE   GLUCOSE MONITOR NURSING POCT   GLUCOSE MONITOR NURSING POCT   GLUCOSE MONITOR NURSING POCT   GLUCOSE MONITOR NURSING POCT        XR Chest Port 1 View   Final Result   IMPRESSION: Left greater than right groundglass and interstitial opacities, may reflect Covid pneumonia in the proper clinical context, recommend follow-up to resolution. No pneumothorax or pleural effusion. Cardiac silhouette within normal  limits.    Mildly atherosclerotic aorta.            Treatments and/or interventions provided:     Medications   remdesivir 100 mg in sodium chloride 0.9 % 250 mL intermittent infusion (has no administration in time range)     And   0.9% sodium chloride BOLUS (has no administration in time range)   dexamethasone (DECADRON) tablet 6 mg (has no administration in time range)   albuterol (PROVENTIL HFA/VENTOLIN HFA) inhaler (has no administration in time range)   HYDROmorphone (DILAUDID) injection 0.2 mg (has no administration in time range)   insulin aspart (NovoLOG) injection (RAPID ACTING) (has no administration in time range)   insulin aspart (NovoLOG) injection (RAPID ACTING) (has no administration in time range)   dexamethasone PF (DECADRON) injection 6 mg (6 mg Intravenous Given 1/11/22 0233)   insulin aspart (NovoLOG) injection (RAPID ACTING) (8 Units Subcutaneous Given 1/11/22 0232)   remdesivir 200 mg in sodium chloride 0.9 % 250 mL intermittent infusion (0 mg Intravenous Stopped 1/11/22 0345)     Followed by   0.9% sodium chloride BOLUS (0 mLs Intravenous Stopped 1/11/22 0345)        Patient's response to treatments and/or interventions:   sleeping    To be done/followed up on inpatient unit:    See any in-patient orders    Does this patient have any cognitive concerns?: na    Activity level - Baseline/Home:    Independent    Activity Level - Current:     Independent    Patient's Preferred language: English     Needed?: No    Isolation: COVID r/o and special precautions  Infection: COVID r/o and special precautions  Patient tested for COVID 19 prior to admission: YES    Bariatric?: No    Vital Signs:   Vitals:    01/11/22 0430 01/11/22 0500 01/11/22 0530 01/11/22 0600   BP: (!) 148/79 122/67 128/76 125/72   Pulse: 94 94 95 102   Resp:       Temp:       TempSrc:       SpO2: 93% 94% 95% 95%   Weight:       Height:           Cardiac Rhythm:     Was the PSS-3 completed:   Yes  What interventions are  required if any?               Family Comments: none present  OBS brochure/video discussed/provided to patient/family: Yes              Name of person given brochure if not patient:               Relationship to patient:     For the majority of the shift this patient's behavior was Green.   Behavioral interventions performed were .    ED NURSE PHONE NUMBER: *[phone]

## 2022-01-11 NOTE — PHARMACY-ADMISSION MEDICATION HISTORY
Pharmacy Medication History  Admission medication history interview status for the 1/11/2022  admission is complete. See EPIC admission navigator for prior to admission medications     Location of Interview: Phone  Medication history sources: Patient    Significant changes made to the medication list:  Removed metformin 500 mg XR tablet  Removed rosuvastatin  Added metformin 1000 mg tablet twice daily  Added pregabalin 100 mg three times daily  Changed insulin dose (was 10 units twice daily before meals)    In the past week, patient estimated taking medication this percent of the time: 50-90% due to forgetfulness    Additional medication history information:   None    Medication reconciliation completed by provider prior to medication history? No    Time spent in this activity: 10 minutes    Prior to Admission medications    Medication Sig Last Dose Taking? Auth Provider   aspirin 81 MG EC tablet [ASPIRIN 81 MG EC TABLET] Take 1 tablet (81 mg total) by mouth daily. Past Week at Unknown time Yes Nasim Astorga MD   insulin NPH-insulin regular (NOVOLIN 70-30) 100 unit/mL (70-30) injection [INSULIN NPH-INSULIN REGULAR (NOVOLIN 70-30) 100 UNIT/ML (70-30) INJECTION] Inject 10 Units under the skin 2 (two) times a day before meals. 11.65 Type 2 with hyperglycemia  Patient taking differently: Inject Subcutaneous 2 times daily (before meals) 17 units every morning and 15 units every evening  Yes Chidi Silva, DO   metFORMIN (GLUCOPHAGE) 1000 MG tablet Take 1,000 mg by mouth 2 times daily (with meals) Past Week at Unknown time Yes Unknown, Entered By History   pregabalin (LYRICA) 100 MG capsule Take 100 mg by mouth 3 times daily Past Week at Unknown time Yes Unknown, Entered By History   blood glucose test strips [BLOOD GLUCOSE TEST STRIPS] Use 1 each As Directed 3 (three) times a day. Dispense brand per patient's insurance at pharmacy discretion.   Chidi Silva, DO   generic lancets (FINGERSTIX LANCETS) [GENERIC  "LANCETS (FINGERSTIX LANCETS)] Use 1 each As Directed 2 (two) times a day. Dispense brand per patient's insurance at pharmacy discretion.   Chidi Silva, DO   pen needle, diabetic (UNIFINE PENTIPS) 31 gauge x 3/16\" Ndle [PEN NEEDLE, DIABETIC (UNIFINE PENTIPS) 31 GAUGE X 3/16\" NDLE] Use 1 each As Directed 2 (two) times a day.   Chidi Silva, DO       The information provided in this note is only as accurate as the sources available at the time of update(s)         "

## 2022-01-12 VITALS
WEIGHT: 175 LBS | DIASTOLIC BLOOD PRESSURE: 88 MMHG | BODY MASS INDEX: 23.7 KG/M2 | HEIGHT: 72 IN | SYSTOLIC BLOOD PRESSURE: 153 MMHG | TEMPERATURE: 98.9 F | HEART RATE: 96 BPM | RESPIRATION RATE: 16 BRPM | OXYGEN SATURATION: 91 %

## 2022-01-12 LAB
ANION GAP SERPL CALCULATED.3IONS-SCNC: 5 MMOL/L (ref 3–14)
BUN SERPL-MCNC: 18 MG/DL (ref 7–30)
CALCIUM SERPL-MCNC: 8.6 MG/DL (ref 8.5–10.1)
CHLORIDE BLD-SCNC: 104 MMOL/L (ref 94–109)
CO2 SERPL-SCNC: 26 MMOL/L (ref 20–32)
CREAT SERPL-MCNC: 0.89 MG/DL (ref 0.66–1.25)
CRP SERPL-MCNC: 49.6 MG/L (ref 0–8)
D DIMER PPP FEU-MCNC: 0.61 UG/ML FEU (ref 0–0.5)
ERYTHROCYTE [DISTWIDTH] IN BLOOD BY AUTOMATED COUNT: 12.8 % (ref 10–15)
FIBRINOGEN PPP-MCNC: 565 MG/DL (ref 170–490)
GFR SERPL CREATININE-BSD FRML MDRD: >90 ML/MIN/1.73M2
GLUCOSE BLD-MCNC: 154 MG/DL (ref 70–99)
GLUCOSE BLDC GLUCOMTR-MCNC: 125 MG/DL (ref 70–99)
GLUCOSE BLDC GLUCOMTR-MCNC: 378 MG/DL (ref 70–99)
HCT VFR BLD AUTO: 42.1 % (ref 40–53)
HGB BLD-MCNC: 13.5 G/DL (ref 13.3–17.7)
MCH RBC QN AUTO: 29.1 PG (ref 26.5–33)
MCHC RBC AUTO-ENTMCNC: 32.1 G/DL (ref 31.5–36.5)
MCV RBC AUTO: 91 FL (ref 78–100)
PLATELET # BLD AUTO: 234 10E3/UL (ref 150–450)
POTASSIUM BLD-SCNC: 3.7 MMOL/L (ref 3.4–5.3)
RBC # BLD AUTO: 4.64 10E6/UL (ref 4.4–5.9)
SODIUM SERPL-SCNC: 135 MMOL/L (ref 133–144)
WBC # BLD AUTO: 2.9 10E3/UL (ref 4–11)

## 2022-01-12 PROCEDURE — 258N000003 HC RX IP 258 OP 636: Performed by: NURSE PRACTITIONER

## 2022-01-12 PROCEDURE — 250N000009 HC RX 250: Performed by: NURSE PRACTITIONER

## 2022-01-12 PROCEDURE — 85384 FIBRINOGEN ACTIVITY: CPT | Performed by: NURSE PRACTITIONER

## 2022-01-12 PROCEDURE — 250N000013 HC RX MED GY IP 250 OP 250 PS 637: Performed by: NURSE PRACTITIONER

## 2022-01-12 PROCEDURE — 36415 COLL VENOUS BLD VENIPUNCTURE: CPT | Performed by: NURSE PRACTITIONER

## 2022-01-12 PROCEDURE — 86140 C-REACTIVE PROTEIN: CPT | Performed by: NURSE PRACTITIONER

## 2022-01-12 PROCEDURE — 82310 ASSAY OF CALCIUM: CPT | Performed by: NURSE PRACTITIONER

## 2022-01-12 PROCEDURE — 250N000011 HC RX IP 250 OP 636: Performed by: NURSE PRACTITIONER

## 2022-01-12 PROCEDURE — 85027 COMPLETE CBC AUTOMATED: CPT | Performed by: NURSE PRACTITIONER

## 2022-01-12 PROCEDURE — 99239 HOSP IP/OBS DSCHRG MGMT >30: CPT | Performed by: STUDENT IN AN ORGANIZED HEALTH CARE EDUCATION/TRAINING PROGRAM

## 2022-01-12 PROCEDURE — 85379 FIBRIN DEGRADATION QUANT: CPT | Performed by: NURSE PRACTITIONER

## 2022-01-12 RX ORDER — CALCIUM CARBONATE 500 MG/1
1000 TABLET, CHEWABLE ORAL EVERY 4 HOURS PRN
Status: DISCONTINUED | OUTPATIENT
Start: 2022-01-12 | End: 2022-01-12 | Stop reason: HOSPADM

## 2022-01-12 RX ADMIN — ENOXAPARIN SODIUM 40 MG: 40 INJECTION SUBCUTANEOUS at 08:53

## 2022-01-12 RX ADMIN — CALCIUM CARBONATE (ANTACID) CHEW TAB 500 MG 500 MG: 500 CHEW TAB at 08:53

## 2022-01-12 RX ADMIN — SODIUM CHLORIDE: 9 INJECTION, SOLUTION INTRAVENOUS at 03:18

## 2022-01-12 RX ADMIN — REMDESIVIR 100 MG: 100 INJECTION, POWDER, LYOPHILIZED, FOR SOLUTION INTRAVENOUS at 06:11

## 2022-01-12 RX ADMIN — FAMOTIDINE 20 MG: 20 TABLET ORAL at 08:53

## 2022-01-12 RX ADMIN — ASPIRIN 81 MG: 81 TABLET, COATED ORAL at 08:53

## 2022-01-12 RX ADMIN — PREGABALIN 100 MG: 100 CAPSULE ORAL at 08:53

## 2022-01-12 ASSESSMENT — ACTIVITIES OF DAILY LIVING (ADL)
ADLS_ACUITY_SCORE: 5

## 2022-01-12 NOTE — PROGRESS NOTES
Alert and oriented x4. VSS. Diminished lung sounds. On RA and sating at 91-94%. Requested to see the the provider; was very impatient. He complained, wanting to be discharged. He was later able to see the provider who addressed his concerns.  Active bowel sounds x4q. Declined to eat meals (breakfast and lunch). Voiding adequately. Denies pain.    Pt was discharged to home by 1530 today. He was accompanied by a friend. AVS was reviewed with pt. He admitted to understanding the instructions.

## 2022-01-12 NOTE — DISCHARGE SUMMARY
[facility]  Hospitalist Discharge Summary      Date of Admission:  1/11/2022  Date of Discharge:  1/12/2022  Discharging Provider: Shannan Mulligan DO      Discharge Diagnoses   COVID infection  Uncontrolled diabetes     Follow-ups Needed After Discharge   Follow-up Appointments     Follow-up and recommended labs and tests       Follow up with primary care provider, Pili Hernandez, within 7-14 days   for hospital follow- up and to discuss uncontrolled diabetes. Recommend   BMP in 1 week.             Discharge Disposition   Discharged to home  Condition at discharge: Stable    Hospital Course   Curtis Varghese is a 61 year old male with a past medical history significant for DM2 and neuropathy admitted on 1/11/2022 for COVID-19 infection. He was day 7 of his infection and symptoms on admission mostly revolved around nausea, vomiting and decreased appetite. He was also found to have uncontrolled BG likely due to noncompliance with insulin and meds per patient. A1C 11 on admission. He was admitted and treated symptomatically with fluids and electrolyte replacements. He was started on steroids for possible bilateral opacities but did not display hypoxia. During admission his BG remained uncontrolled likely 2/2 to starting steroids. Patient was aggressive and verbally abusive to staff. He did not believe it was appropriate to ask vaccination status. He requested to leave on HD2. It was recommended he stay another day for further hydration and insulin adjustments but he refused. No steroids will be prescribed on discharge due to minimal benefit without hypoxia or major infiltrates and major risk in uncontrolled diabetic who was not compliant with home insulin. It was recommended he visit with his PCP in 1-2 weeks to have a BMP, adjust his insulin and follow his symptoms.    Detailed problems outlined below.     Confirmed COVID-19 infection  developed a cough along with headaches and  myalgias. Patient has worsened since his diagnosis with increasing fatigue, pain, now with nausea/vomiting/diarrhea and also a decrease in appetite and inability to eat and drink. Patient endorses chest pain and back pain with coughing.   - given 2 doses of steroids and started on remdesivir  - discharge with supportive care     DM2 poorly controlled at baseline with A1c of 11.1%, and currently hyperglycemic likely secondary to steroids PTA regimen includes 70/30 insulin 17 units in the a.m. and 15 units in the p.m.  - resume home insulin on discharge. Encourage patient to take it daily     Hypertensive  - while inpatient  - recommend outpatient follow up     Dyspepsia  - As needed Tums and Pepcid     Hyponatremia   - psuedo and related to elevated BG     Anemia, acute, normocytic  - lab error, normal on follow up        Consultations This Hospital Stay   None    Code Status   Full Code    Time Spent on this Encounter   IShannan DO, personally saw the patient today and spent greater than 30 minutes discharging this patient.       Shannan Mulligan DO  [place] [address] [facility]  [address] [place] [address]  Phone: [phone]  ______________________________________________________________________    Physical Exam   Vital Signs: Temp: 98.9  F (37.2  C) Temp src: Oral BP: (!) 153/88 Pulse: 96   Resp: 16 SpO2: 91 % O2 Device: None (Room air)    Weight: 175 lbs 0 oz       Primary Care Physician   Pili Hernandez    Discharge Orders      Reason for your hospital stay    You presented for weakness likely due to nausea and ongoing symptoms from your COVID diagnosis. You were given fluids and increased insulin and demonstrated improvement in your symptoms.     Follow-up and recommended labs and tests     Follow up with primary care provider, Pili Hernandez, within 7-14 days for hospital follow- up and to discuss uncontrolled diabetes. Recommend BMP in 1 week.      Activity    Your activity upon discharge: activity as tolerated     Diet    Follow this diet upon discharge: Orders Placed This Encounter      Moderate Consistent Carb (60 g CHO per Meal) Diet       Significant Results and Procedures   Most Recent 3 CBC's:Recent Labs   Lab Test 01/12/22  0752 01/11/22  1001 01/11/22  0032   WBC 2.9* 1.7* 2.5*   HGB 13.5 12.1* 13.5   MCV 91 87 86    218 226     Most Recent 3 BMP's:Recent Labs   Lab Test 01/12/22  1131 01/12/22  1012 01/12/22  0203 01/11/22  2123 01/11/22  1550 01/11/22  1205 01/11/22  1001   NA  --  135  --   --  131*  --  129*   POTASSIUM  --  3.7  --   --  4.4  --  3.9   CHLORIDE  --  104  --   --  99  --  98   CO2  --  26  --   --  24  --  21   BUN  --  18  --   --  17  --  19   CR  --  0.89  --   --  0.81  --  0.83   ANIONGAP  --  5  --   --  8  --  10   MIGEL  --  8.6  --   --  9.2  --  8.4*   * 154* 378*   < > 316*   < > 481*    < > = values in this interval not displayed.     Most Recent 2 LFT's:Recent Labs   Lab Test 01/11/22  1001 01/11/22  0032   AST 16 21   ALT 21 22   ALKPHOS 76 86   BILITOTAL 0.3 0.4   ,   Results for orders placed or performed during the hospital encounter of 01/11/22   XR Chest Port 1 View    Narrative    EXAM: XR CHEST PORT 1 VIEW  LOCATION: [facility]  DATE/TIME: 1/11/2022 1:48 AM    INDICATION: cough, covid  COMPARISON: None.      Impression    IMPRESSION: Left greater than right groundglass and interstitial opacities, may reflect Covid pneumonia in the proper clinical context, recommend follow-up to resolution. No pneumothorax or pleural effusion. Cardiac silhouette within normal limits.   Mildly atherosclerotic aorta.       Discharge Medications   Current Discharge Medication List      CONTINUE these medications which have NOT CHANGED    Details   aspirin 81 MG EC tablet [ASPIRIN 81 MG EC TABLET] Take 1 tablet (81 mg total) by mouth daily.  Qty: 150 tablet, Refills: 2      insulin  "NPH-insulin regular (NOVOLIN 70-30) 100 unit/mL (70-30) injection [INSULIN NPH-INSULIN REGULAR (NOVOLIN 70-30) 100 UNIT/ML (70-30) INJECTION] Inject 10 Units under the skin 2 (two) times a day before meals. 11.65 Type 2 with hyperglycemia  Qty: 6 mL, Refills: 0    Associated Diagnoses: Type 2 diabetes mellitus with diabetic neuropathy, without long-term current use of insulin (H)      metFORMIN (GLUCOPHAGE) 1000 MG tablet Take 1,000 mg by mouth 2 times daily (with meals)      pregabalin (LYRICA) 100 MG capsule Take 100 mg by mouth 3 times daily      blood glucose test strips [BLOOD GLUCOSE TEST STRIPS] Use 1 each As Directed 3 (three) times a day. Dispense brand per patient's insurance at pharmacy discretion.  Qty: 100 strip, Refills: 11    Associated Diagnoses: Type 2 diabetes mellitus with diabetic neuropathy, without long-term current use of insulin (H)      generic lancets (FINGERSTIX LANCETS) [GENERIC LANCETS (FINGERSTIX LANCETS)] Use 1 each As Directed 2 (two) times a day. Dispense brand per patient's insurance at pharmacy discretion.  Qty: 60 each, Refills: 11    Associated Diagnoses: Type 2 diabetes mellitus with diabetic neuropathy, without long-term current use of insulin (H)      pen needle, diabetic (UNIFINE PENTIPS) 31 gauge x 3/16\" Ndle [PEN NEEDLE, DIABETIC (UNIFINE PENTIPS) 31 GAUGE X 3/16\" NDLE] Use 1 each As Directed 2 (two) times a day.  Qty: 100 each, Refills: 3    Associated Diagnoses: Type 2 diabetes mellitus with diabetic neuropathy, without long-term current use of insulin (H)           Allergies   No Known Allergies  "

## 2022-01-12 NOTE — PLAN OF CARE
Summary: COVID (+)  DATE & TIME: 1/11/22 7a-730p  Cognitive Concerns/ Orientation : AOx4  BEHAVIOR & AGGRESSION TOOL COLOR: Yellow can be verbally abusive to staff   ABNL VS/O2: VSS on RA from when he came to the floor. Pt is Q4h VS but refused VS to be taken this evening.   MOBILITY: Independent  PAIN MANAGMENT: Denies pain  but has indigestion which he wants unlimited tums for   DIET: mod carb diet  BOWEL/BLADDER: Continent of BB  ABNL LAB/BG: , Sodium 131  DRAIN/DEVICES: PIV left hand NS running at 75ml/hr    TELEMETRY RHYTHM:   SKIN: WDL  TESTS/PROCEDURES: NA  D/C DAY/GOALS/PLACE: Pending improvement   OTHER IMPORTANT INFO: Pt upset about not having an unlimited amount of tums available to him in his room.  Spoke with patient he hung up on her. Refused medications and insulin this evening due to being upset about the tums. Refer to progress notes.

## 2022-01-12 NOTE — PLAN OF CARE
DATE & TIME: 1/12/22     Cognitive Concerns/ Orientation :Alert and Oriented x4  BEHAVIOR & AGGRESSION TOOL COLOR: Green    ABNL VS/O2: VSS on RA; declined 0400 vitals in favor of sleep.    MOBILITY: Independent  PAIN MANAGMENT: Denies  DIET: Mod CHO  BOWEL/BLADDER: Continent  ABNL LAB/BG: CRP 71.6, - extra 5 units of novolog given.    DRAIN/DEVICES: IVF infusing  TELEMETRY RHYTHM: NSR  SKIN: Intact  D/C DATE: Patient is requesting discharge today.  States he is feeling better and would like to go home and finish his recuperation.    OTHER IMPORTANT INFO: Lung sounds diminished. PERSON. Pt has tremor in both hands and feet per patient this has happened intermittently in the last few days. Pt agreeable to taking all medications and insulin this evening.

## 2022-01-12 NOTE — PLAN OF CARE
DATE & TIME: 1/11/22 1900-2300    Cognitive Concerns/ Orientation : Pt A/Ox4   BEHAVIOR & AGGRESSION TOOL COLOR: Green this shift, yellow previously   ABNL VS/O2: VSS on RA  MOBILITY: Independent  PAIN MANAGMENT: Denies  DIET: Mod CHO  BOWEL/BLADDER: Continent  ABNL LAB/BG: CRP 71.6/  DRAIN/DEVICES: IVF infusing  TELEMETRY RHYTHM: NSR  SKIN: Intact  D/C DATE: Discharge pending progress  OTHER IMPORTANT INFO: Lung sounds diminished. PERSON. Pt has tremor in both hands and feet per patient this has happened intermittently in the last few days. Pt agreeable to taking all medications and insulin this evening.

## 2022-01-13 ENCOUNTER — PATIENT OUTREACH (OUTPATIENT)
Dept: CARE COORDINATION | Facility: CLINIC | Age: 62
End: 2022-01-13

## 2022-01-13 DIAGNOSIS — Z71.89 OTHER SPECIFIED COUNSELING: ICD-10-CM

## 2022-01-13 NOTE — PROGRESS NOTES
Clinic Care Coordination Contact  [facility]/Voicemail       Clinical Data: Care Coordinator Outreach  Outreach attempted x 1. Unable to leave a  message on patient's voicemail with call back information.  Plan:  Care Coordinator will try to reach patient again in 1-2 business days.    Claude Zafar  Community Health Worker  [facility]  Ph:[phone]

## 2022-01-14 NOTE — PROGRESS NOTES
Clinic Care Coordination Contact  [facility]/Voicemail       Clinical Data: Care Coordinator Outreach  Outreach attempted x 2. Unable to leave a  message on patient's voicemail with call back information.  Plan:  Care Coordinator will do no further outreaches at this time.    Claude Zafar  Community Health Worker  [facility]  Ph:[phone]

## 2022-02-19 ENCOUNTER — HOSPITAL ENCOUNTER (EMERGENCY)
Facility: CLINIC | Age: 62
Discharge: HOME | End: 2022-02-19
Attending: EMERGENCY MEDICINE | Admitting: EMERGENCY MEDICINE

## 2022-02-19 ENCOUNTER — OFFICE VISIT (OUTPATIENT)
Dept: URGENT CARE | Facility: URGENT CARE | Age: 62
End: 2022-02-19

## 2022-02-19 ENCOUNTER — ANCILLARY PROCEDURE (OUTPATIENT)
Dept: GENERAL RADIOLOGY | Facility: CLINIC | Age: 62
End: 2022-02-19
Attending: NURSE PRACTITIONER

## 2022-02-19 VITALS
TEMPERATURE: 97.5 F | WEIGHT: 175 LBS | DIASTOLIC BLOOD PRESSURE: 78 MMHG | BODY MASS INDEX: 23.73 KG/M2 | OXYGEN SATURATION: 97 % | RESPIRATION RATE: 15 BRPM | HEART RATE: 100 BPM | SYSTOLIC BLOOD PRESSURE: 122 MMHG

## 2022-02-19 VITALS
HEART RATE: 114 BPM | DIASTOLIC BLOOD PRESSURE: 86 MMHG | OXYGEN SATURATION: 98 % | SYSTOLIC BLOOD PRESSURE: 130 MMHG | TEMPERATURE: 98.1 F | RESPIRATION RATE: 18 BRPM

## 2022-02-19 DIAGNOSIS — R10.84 ABDOMINAL PAIN, GENERALIZED: ICD-10-CM

## 2022-02-19 DIAGNOSIS — K59.00 CONSTIPATION, UNSPECIFIED CONSTIPATION TYPE: ICD-10-CM

## 2022-02-19 DIAGNOSIS — R73.9 HYPERGLYCEMIA: ICD-10-CM

## 2022-02-19 DIAGNOSIS — K56.41: ICD-10-CM

## 2022-02-19 DIAGNOSIS — G62.9 PERIPHERAL POLYNEUROPATHY: ICD-10-CM

## 2022-02-19 LAB
ALBUMIN SERPL-MCNC: 3.6 G/DL (ref 3.4–5)
ALBUMIN UR-MCNC: NEGATIVE MG/DL
ALP SERPL-CCNC: 133 U/L (ref 40–150)
ALT SERPL W P-5'-P-CCNC: 41 U/L (ref 0–70)
ANION GAP SERPL CALCULATED.3IONS-SCNC: 5 MMOL/L (ref 3–14)
ANION GAP SERPL CALCULATED.3IONS-SCNC: 8 MMOL/L (ref 3–14)
APPEARANCE UR: CLEAR
AST SERPL W P-5'-P-CCNC: 17 U/L (ref 0–45)
BASOPHILS # BLD AUTO: 0 10E3/UL (ref 0–0.2)
BASOPHILS # BLD AUTO: 0 10E3/UL (ref 0–0.2)
BASOPHILS NFR BLD AUTO: 0 %
BASOPHILS NFR BLD AUTO: 1 %
BILIRUB SERPL-MCNC: 0.4 MG/DL (ref 0.2–1.3)
BILIRUB UR QL STRIP: NEGATIVE
BUN SERPL-MCNC: 17 MG/DL (ref 7–30)
BUN SERPL-MCNC: 18 MG/DL (ref 7–30)
CALCIUM SERPL-MCNC: 10.1 MG/DL (ref 8.5–10.1)
CALCIUM SERPL-MCNC: 9.9 MG/DL (ref 8.5–10.1)
CHLORIDE BLD-SCNC: 98 MMOL/L (ref 94–109)
CHLORIDE BLD-SCNC: 99 MMOL/L (ref 94–109)
CO2 SERPL-SCNC: 27 MMOL/L (ref 20–32)
CO2 SERPL-SCNC: 29 MMOL/L (ref 20–32)
COLOR UR AUTO: YELLOW
CREAT SERPL-MCNC: 0.82 MG/DL (ref 0.66–1.25)
CREAT SERPL-MCNC: 0.82 MG/DL (ref 0.66–1.25)
EOSINOPHIL # BLD AUTO: 0.1 10E3/UL (ref 0–0.7)
EOSINOPHIL # BLD AUTO: 0.1 10E3/UL (ref 0–0.7)
EOSINOPHIL NFR BLD AUTO: 1 %
EOSINOPHIL NFR BLD AUTO: 2 %
ERYTHROCYTE [DISTWIDTH] IN BLOOD BY AUTOMATED COUNT: 13.6 % (ref 10–15)
ERYTHROCYTE [DISTWIDTH] IN BLOOD BY AUTOMATED COUNT: 13.9 % (ref 10–15)
GFR SERPL CREATININE-BSD FRML MDRD: >90 ML/MIN/1.73M2
GFR SERPL CREATININE-BSD FRML MDRD: >90 ML/MIN/1.73M2
GLUCOSE BLD-MCNC: 473 MG/DL (ref 70–99)
GLUCOSE BLD-MCNC: 595 MG/DL (ref 70–99)
GLUCOSE BLDC GLUCOMTR-MCNC: 349 MG/DL (ref 70–99)
GLUCOSE UR STRIP-MCNC: >=1000 MG/DL
HCT VFR BLD AUTO: 40.8 % (ref 40–53)
HCT VFR BLD AUTO: 42 % (ref 40–53)
HGB BLD-MCNC: 13.1 G/DL (ref 13.3–17.7)
HGB BLD-MCNC: 13.5 G/DL (ref 13.3–17.7)
HGB UR QL STRIP: NEGATIVE
IMM GRANULOCYTES # BLD: 0 10E3/UL
IMM GRANULOCYTES NFR BLD: 0 %
KETONES UR STRIP-MCNC: NEGATIVE MG/DL
LEUKOCYTE ESTERASE UR QL STRIP: NEGATIVE
LYMPHOCYTES # BLD AUTO: 1.6 10E3/UL (ref 0.8–5.3)
LYMPHOCYTES # BLD AUTO: 1.8 10E3/UL (ref 0.8–5.3)
LYMPHOCYTES NFR BLD AUTO: 39 %
LYMPHOCYTES NFR BLD AUTO: 44 %
MCH RBC QN AUTO: 28.4 PG (ref 26.5–33)
MCH RBC QN AUTO: 28.7 PG (ref 26.5–33)
MCHC RBC AUTO-ENTMCNC: 32.1 G/DL (ref 31.5–36.5)
MCHC RBC AUTO-ENTMCNC: 32.1 G/DL (ref 31.5–36.5)
MCV RBC AUTO: 88 FL (ref 78–100)
MCV RBC AUTO: 89 FL (ref 78–100)
MONOCYTES # BLD AUTO: 0.4 10E3/UL (ref 0–1.3)
MONOCYTES # BLD AUTO: 0.5 10E3/UL (ref 0–1.3)
MONOCYTES NFR BLD AUTO: 10 %
MONOCYTES NFR BLD AUTO: 12 %
NEUTROPHILS # BLD AUTO: 1.5 10E3/UL (ref 1.6–8.3)
NEUTROPHILS # BLD AUTO: 2.2 10E3/UL (ref 1.6–8.3)
NEUTROPHILS NFR BLD AUTO: 42 %
NEUTROPHILS NFR BLD AUTO: 50 %
NITRATE UR QL: NEGATIVE
NRBC # BLD AUTO: 0 10E3/UL
NRBC BLD AUTO-RTO: 0 /100
PH UR STRIP: 5.5 [PH] (ref 5–7)
PLATELET # BLD AUTO: 334 10E3/UL (ref 150–450)
PLATELET # BLD AUTO: 350 10E3/UL (ref 150–450)
POTASSIUM BLD-SCNC: 4.8 MMOL/L (ref 3.4–5.3)
POTASSIUM BLD-SCNC: 5 MMOL/L (ref 3.4–5.3)
PROT SERPL-MCNC: 8.6 G/DL (ref 6.8–8.8)
RBC # BLD AUTO: 4.62 10E6/UL (ref 4.4–5.9)
RBC # BLD AUTO: 4.7 10E6/UL (ref 4.4–5.9)
SODIUM SERPL-SCNC: 133 MMOL/L (ref 133–144)
SODIUM SERPL-SCNC: 133 MMOL/L (ref 133–144)
SP GR UR STRIP: 1.02 (ref 1–1.03)
UROBILINOGEN UR STRIP-ACNC: 0.2 E.U./DL
WBC # BLD AUTO: 3.6 10E3/UL (ref 4–11)
WBC # BLD AUTO: 4.6 10E3/UL (ref 4–11)

## 2022-02-19 PROCEDURE — 80053 COMPREHEN METABOLIC PANEL: CPT | Performed by: NURSE PRACTITIONER

## 2022-02-19 PROCEDURE — 99215 OFFICE O/P EST HI 40 MIN: CPT | Performed by: NURSE PRACTITIONER

## 2022-02-19 PROCEDURE — 36415 COLL VENOUS BLD VENIPUNCTURE: CPT | Performed by: NURSE PRACTITIONER

## 2022-02-19 PROCEDURE — 96372 THER/PROPH/DIAG INJ SC/IM: CPT | Performed by: EMERGENCY MEDICINE

## 2022-02-19 PROCEDURE — 96360 HYDRATION IV INFUSION INIT: CPT

## 2022-02-19 PROCEDURE — 36415 COLL VENOUS BLD VENIPUNCTURE: CPT | Performed by: EMERGENCY MEDICINE

## 2022-02-19 PROCEDURE — 85025 COMPLETE CBC W/AUTO DIFF WBC: CPT | Performed by: NURSE PRACTITIONER

## 2022-02-19 PROCEDURE — 99284 EMERGENCY DEPT VISIT MOD MDM: CPT | Mod: 25

## 2022-02-19 PROCEDURE — 74019 RADEX ABDOMEN 2 VIEWS: CPT | Performed by: RADIOLOGY

## 2022-02-19 PROCEDURE — 85014 HEMATOCRIT: CPT | Performed by: EMERGENCY MEDICINE

## 2022-02-19 PROCEDURE — 96361 HYDRATE IV INFUSION ADD-ON: CPT

## 2022-02-19 PROCEDURE — 258N000003 HC RX IP 258 OP 636: Performed by: EMERGENCY MEDICINE

## 2022-02-19 PROCEDURE — 250N000012 HC RX MED GY IP 250 OP 636 PS 637: Performed by: EMERGENCY MEDICINE

## 2022-02-19 PROCEDURE — 81003 URINALYSIS AUTO W/O SCOPE: CPT | Performed by: NURSE PRACTITIONER

## 2022-02-19 PROCEDURE — 250N000013 HC RX MED GY IP 250 OP 250 PS 637: Performed by: EMERGENCY MEDICINE

## 2022-02-19 RX ORDER — POLYETHYLENE GLYCOL 3350 17 G/17G
17 POWDER, FOR SOLUTION ORAL ONCE
Status: COMPLETED | OUTPATIENT
Start: 2022-02-19 | End: 2022-02-19

## 2022-02-19 RX ORDER — POLYETHYLENE GLYCOL 3350 17 G/17G
1 POWDER, FOR SOLUTION ORAL DAILY
Qty: 527 G | Refills: 0 | Status: SHIPPED | OUTPATIENT
Start: 2022-02-19 | End: 2022-03-21

## 2022-02-19 RX ORDER — HUMAN INSULIN 100 [IU]/ML
INJECTION, SUSPENSION SUBCUTANEOUS
COMMUNITY
Start: 2021-12-28

## 2022-02-19 RX ORDER — PEN NEEDLE, DIABETIC 32GX 5/32"
NEEDLE, DISPOSABLE MISCELLANEOUS
COMMUNITY
Start: 2021-11-24

## 2022-02-19 RX ADMIN — POLYETHYLENE GLYCOL 3350 17 G: 17 POWDER, FOR SOLUTION ORAL at 19:13

## 2022-02-19 RX ADMIN — INSULIN ASPART 10 UNITS: 100 INJECTION, SUSPENSION SUBCUTANEOUS at 20:48

## 2022-02-19 RX ADMIN — SODIUM CHLORIDE 1000 ML: 9 INJECTION, SOLUTION INTRAVENOUS at 18:25

## 2022-02-19 RX ADMIN — DOCUSATE SODIUM 286 ML: 50 LIQUID ORAL at 21:33

## 2022-02-19 ASSESSMENT — ENCOUNTER SYMPTOMS
DIARRHEA: 0
FEVER: 0
VOMITING: 0
BLOOD IN STOOL: 0
NAUSEA: 0
CHILLS: 0
ABDOMINAL PAIN: 0
ABDOMINAL DISTENTION: 1
CONSTIPATION: 1

## 2022-02-19 NOTE — ED TRIAGE NOTES
"Went to  for constipation. They did xray that shows he is backed up. Also BS of 473. \"I haven't been real regular about taking my insulin shots.\"  "

## 2022-02-19 NOTE — PROGRESS NOTES
Chief Complaint   Patient presents with     Urgent Care     abdominal pain due to digestive issue X 2 weeks not having bowel movement.         ICD-10-CM    1. Uncontrolled type 2 diabetes mellitus with diabetic neuropathy, with long-term current use of insulin (H)  E11.40     Z79.4     E11.65    2. Abdominal pain, generalized  R10.84 XR Abdomen 2 Views     Comprehensive metabolic panel (BMP + Alb, Alk Phos, ALT, AST, Total. Bili, TP)     CBC with platelets and differential     UA macro with reflex to Microscopic and Culture - Clinc Collect     Comprehensive metabolic panel (BMP + Alb, Alk Phos, ALT, AST, Total. Bili, TP)     CBC with platelets and differential     CANCELED: XR Abdomen 2 Views   3. Impacted stool in intestine (H)  K56.41    4. Peripheral polyneuropathy  G62.9    5. Hyperglycemia  R73.9      Discussed lab results with patient as well as x-ray results and recommendation was made that he go to the emergency room for further assessment and possible admission.  Call was placed to [facility] emergency room after patient agreed to go there for further treatment.  Spoke with DUGLAS Mai to inform them of patient's impending arrival.    Abdominal x-ray shows impacted stool throughout the colon as read by this provider.    Results for orders placed or performed in visit on 02/19/22 (from the past 24 hour(s))   UA macro with reflex to Microscopic and Culture - Clinc Collect    Specimen: Urine, Clean Catch   Result Value Ref Range    Color Urine Yellow Colorless, Straw, Light Yellow, Yellow    Appearance Urine Clear Clear    Glucose Urine >=1000 (A) Negative mg/dL    Bilirubin Urine Negative Negative    Ketones Urine Negative Negative mg/dL    Specific Gravity Urine 1.020 1.003 - 1.035    Blood Urine Negative Negative    pH Urine 5.5 5.0 - 7.0    Protein Albumin Urine Negative Negative mg/dL    Urobilinogen Urine 0.2 0.2, 1.0 E.U./dL    Nitrite Urine Negative Negative    Leukocyte Esterase Urine Negative  Negative    Narrative    Microscopic not indicated   Comprehensive metabolic panel (BMP + Alb, Alk Phos, ALT, AST, Total. Bili, TP)   Result Value Ref Range    Sodium 133 133 - 144 mmol/L    Potassium 4.8 3.4 - 5.3 mmol/L    Chloride 98 94 - 109 mmol/L    Carbon Dioxide (CO2) 27 20 - 32 mmol/L    Anion Gap 8 3 - 14 mmol/L    Urea Nitrogen 17 7 - 30 mg/dL    Creatinine 0.82 0.66 - 1.25 mg/dL    Calcium 9.9 8.5 - 10.1 mg/dL    Glucose 473 (HH) 70 - 99 mg/dL    Alkaline Phosphatase 133 40 - 150 U/L    AST 17 0 - 45 U/L    ALT 41 0 - 70 U/L    Protein Total 8.6 6.8 - 8.8 g/dL    Albumin 3.6 3.4 - 5.0 g/dL    Bilirubin Total 0.4 0.2 - 1.3 mg/dL    GFR Estimate >90 >60 mL/min/1.73m2   CBC with platelets and differential    Narrative    The following orders were created for panel order CBC with platelets and differential.  Procedure                               Abnormality         Status                     ---------                               -----------         ------                     CBC with platelets and d...[[id number]]  Abnormal            Final result                 Please view results for these tests on the individual orders.   CBC with platelets and differential   Result Value Ref Range    WBC Count 3.6 (L) 4.0 - 11.0 10e3/uL    RBC Count 4.70 4.40 - 5.90 10e6/uL    Hemoglobin 13.5 13.3 - 17.7 g/dL    Hematocrit 42.0 40.0 - 53.0 %    MCV 89 78 - 100 fL    MCH 28.7 26.5 - 33.0 pg    MCHC 32.1 31.5 - 36.5 g/dL    RDW 13.9 10.0 - 15.0 %    Platelet Count 350 150 - 450 10e3/uL    % Neutrophils 42 %    % Lymphocytes 44 %    % Monocytes 12 %    % Eosinophils 2 %    % Basophils 1 %    Absolute Neutrophils 1.5 (L) 1.6 - 8.3 10e3/uL    Absolute Lymphocytes 1.6 0.8 - 5.3 10e3/uL    Absolute Monocytes 0.4 0.0 - 1.3 10e3/uL    Absolute Eosinophils 0.1 0.0 - 0.7 10e3/uL    Absolute Basophils 0.0 0.0 - 0.2 10e3/uL       Subjective     Curtis MIGUEL Varghese is an 61 year old male who presents to clinic today for abdominal  pain and decreased number of Bowel movements.  He reports very hard rocks for stool and never feels like he is emptying.  He has also been restricting his insulin and medication usage due to the inability to pay for more medication.  He reports polyuria.    ROS: 10 point ROS neg other than the symptoms noted above in the HPI.       Objective    /78 (BP Location: Left arm, Patient Position: Sitting, Cuff Size: Adult Regular)   Pulse 100   Temp 97.5  F (36.4  C) (Tympanic)   Resp 15   Wt 79.4 kg (175 lb)   SpO2 97%   BMI 23.73 kg/m    Nurses notes and VS have been reviewed.    Physical Exam       GENERAL APPEARANCE: alert and pale     EYES: PERRL, EOMI, sclera non-icteric  ENT: Mouth appears tacky and dry, ears and nose are within normal limits     NECK: no adenopathy or asymmetry, thyroid normal to palpation     RESP: lungs clear to auscultation - no rales, rhonchi or wheezes     CV: regular rates and rhythm, no murmurs, rubs, or gallop     ABDOMEN: Bowel sounds diminished, several masses palpated and generalized abdominal tenderness     MS: extremities normal- no gross deformities noted; normal muscle tone.     SKIN: no suspicious lesions or rashes     NEURO: Normal strength and tone, mentation intact and speech normal, diminished sensation in both feet     PSYCH: normal thought process; no significant mood disturbance    Patient Instructions   For further[facility] treatment and assessment immediately.      ANNA Luna, CNP  [facility] Provider    The use of Dragon/Odysii dictation services may have been used to construct the content in this note; any grammatical or spelling errors are non-intentional. Please contact the author of this note directly if you are in need of any clarification.

## 2022-02-19 NOTE — PATIENT INSTRUCTIONS
For furtherGo to the emergency room at [facility] there treatment and assessment immediately.

## 2022-02-20 NOTE — ED NOTES
Offered water to patient. A&OX4. Reports taking Colace at home without success, reports not taking any other medications. Appears comfortable, working on computer, appears disinterested.

## 2022-02-20 NOTE — ED NOTES
Patient on the commode, reporting unable to wipe himself with bath wipes, demanding toilet paper. Patient reports passed stools.

## 2022-02-20 NOTE — ED PROVIDER NOTES
"  History     Chief Complaint:  Constipation      HPI   Curtis Varghese is a 61 year old male with past medical history of type 2 diabetes who presents with constipation and elevated blood sugars.  Patient reports for the last week he has been having difficulty with constipation and is only passed small very hard stools.  He is concerned that he may have a large stool ball leading to his constipation.  He went to urgent care today where they performed a x-ray which showed evidence of large stool burden but no evidence of obstruction.  He was also found to be hyperglycemic and was sent to the emergency department for further evaluation.  In the ER, the patient reports that he has been rationing his insulin due to financial issues.  He also reports several dietary indiscretions that have led to his constant elevated blood sugars.  In regards to his constipation he notes that this is been a chronic problem before in the past.  He has been using Colace at home without significant relief.  Patient denies any fever, nausea, vomiting or significant abdominal pain at this time.  Denies any history of abdominal surgeries.    Review of Systems   Constitutional: Negative for chills and fever.   Gastrointestinal: Positive for abdominal distention and constipation. Negative for abdominal pain, blood in stool, diarrhea, nausea and vomiting.   All other systems reviewed and are negative.    Allergies:  No Known Allergies    Medications:    polyethylene glycol (MIRALAX) 17 GM/Dose powder  aspirin 81 MG EC tablet  BD PEN NEEDLE [place] 2ND GEN 32G X 4 MM miscellaneous  blood glucose test strips  generic lancets (FINGERSTIX LANCETS)  metFORMIN (GLUCOPHAGE) 1000 MG tablet  NOVOLIN 70/30 FLEXPEN susp  pen needle, diabetic (UNIFINE PENTIPS) 31 gauge x 3/16\" Ndle  pregabalin (LYRICA) 100 MG capsule      Past Medical History:    Past Medical History:   Diagnosis Date     Acute facial pain 2/14/2019     Type 2 diabetes mellitus with " neurologic complication, without long-term current use of insulin (H) 1/20/2019     Patient Active Problem List    Diagnosis Date Noted     Hyperglycemia 01/11/2022     Priority: Medium     Pneumonia due to 2019 novel coronavirus 01/11/2022     Priority: Medium     Peripheral polyneuropathy 08/13/2020     Priority: Medium     Nasal congestion 02/14/2019     Priority: Medium     Type 2 diabetes mellitus with neurologic complication, without long-term current use of insulin (H) 01/20/2019     Priority: Medium        Past Surgical History:    No pertinent past surgical history     Family History:    No pertinent past family history     Social History:  Presents to the ED alone     Physical Exam     Patient Vitals for the past 24 hrs:   BP Temp Temp src Pulse Resp SpO2   02/19/22 2242 130/86 -- -- 114 18 98 %   02/19/22 1750 133/87 98.1  F (36.7  C) Temporal (!) 122 20 97 %       Physical Exam  General: Patient is awake, alert and interactive when I enter the room  Head: The scalp, face, and head appear normal  Neck: Normal range of motion.   CV: Regular rate and rhythm.   Resp: Lungs are clear without wheezes or rales. No respiratory distress.   GI: Abdomen is soft, no rigidity, guarding, or rebound. No distension. No tenderness to palpation in any quadrant.     MS: Normal tone. Joints grossly normal without effusions. No asymmetric leg swelling, calf or thigh tenderness.    Skin: No rash or lesions noted. Normal capillary refill noted  Neuro: Speech is normal and fluent. Face is symmetric. Moving all extremities.   Psych:  Normal affect.  Appropriate interactions.    Emergency Department Course     Laboratory:  Labs Ordered and Resulted from Time of ED Arrival to Time of ED Departure   BASIC METABOLIC PANEL - Abnormal       Result Value    Sodium 133      Potassium 5.0      Chloride 99      Carbon Dioxide (CO2) 29      Anion Gap 5      Urea Nitrogen 18      Creatinine 0.82      Calcium 10.1      Glucose 595 (*)      GFR Estimate >90     CBC WITH PLATELETS AND DIFFERENTIAL - Abnormal    WBC Count 4.6      RBC Count 4.62      Hemoglobin 13.1 (*)     Hematocrit 40.8      MCV 88      MCH 28.4      MCHC 32.1      RDW 13.6      Platelet Count 334      % Neutrophils 50      % Lymphocytes 39      % Monocytes 10      % Eosinophils 1      % Basophils 0      % Immature Granulocytes 0      NRBCs per 100 WBC 0      Absolute Neutrophils 2.2      Absolute Lymphocytes 1.8      Absolute Monocytes 0.5      Absolute Eosinophils 0.1      Absolute Basophils 0.0      Absolute Immature Granulocytes 0.0      Absolute NRBCs 0.0     GLUCOSE BY METER - Abnormal    GLUCOSE BY METER POCT 349 (*)      Emergency Department Course:    Reviewed:  I reviewed nursing notes, vitals, past history and care everywhere     Interventions:  Medications   0.9% sodium chloride BOLUS (0 mLs Intravenous Stopped 2/19/22 2050)   polyethylene glycol (MIRALAX) Packet 17 g (17 g Oral Given 2/19/22 1913)   Enema Compound (docusate/mag cit/mineral oil/NaPhos) SIMPLE (286 mLs Rectal Given 2/19/22 2133)   insulin aspart prot & aspart (NovoLOG MIX 70/30 PEN) injection 10 Units (10 Units Subcutaneous Given 2/19/22 2048)     Disposition:  The patient was discharged to home.    Impression & Plan      Medical Decision Making:  Curtis Varghese is a 61 year old male who presents for evaluation for decreased stool output without signs of serious intraabdominal problems. Signs and symptoms are consistent with constipation. There are no signs at this point for a need for rectal disimpaction.  There are no signs of obstruction nor other intraabdominal catastrophe.   There are no indications for advanced imaging or admission.  Patient was given MiraLAX and an enema in the emergency department with significant production of stool.  Patient's hyperglycemia was also addressed while he was in the department.  No evidence of significant acidosis necessitating further treatment.  Patient was given  a shot of insulin and his blood sugar came down quite nicely.  Advised him to remain diligent with his insulin administration.  I am going to send them home with instructions on medication management of this. They will then start daily stool softener as well once they are more completely cleaned out. Patient is agreeable with this and questions are answered.      Diagnosis:    ICD-10-CM    1. Constipation, unspecified constipation type  K59.00        Discharge Medications:  Discharge Medication List as of 2/19/2022 11:14 PM      START taking these medications    Details   polyethylene glycol (MIRALAX) 17 GM/Dose powder Take 17 g (1 capful) by mouth daily, Disp-527 g, R-0, Local Print             MD Georgina Gupta, Livan August MD  02/20/22 0141

## 2022-02-20 NOTE — ED NOTES
"Patient told EMT that \"I'm afraid to go home especially if I can't get some more stool to move.\"   "

## 2022-02-20 NOTE — ED NOTES
Patient reporting wanting to wait on the emena, encouraging with each interaction. Patient up to the bathroom currently.

## 2022-02-20 NOTE — ED NOTES
"Discharge paperwork discussed. During discharge, expressed to patient the importance of controlling blood sugars going forward, during the course of ER stay was treated with insulin pen and a dose of SubQ insulin (due to BG of 594). Instructed patient that the pen had multiple doses of insulin and that he was welcome to take the pen home, otherwise staff was required to throw away the pen with the additional doses.     Patient upset about pricing and demanding to know the price of the pen and \"Who do I need to talk to about what this cost?\" Expressed to patient that writer was unaware of pricing, nor whom would know the prices. Patient then stated \"I normally get my insulin for FREE online!\"     Assisted via wheelchair to lobby. Normally ambulatory and independent at baseline.     Patient then approached staff at triage again asking about pricing of insulin pen.     While in lobby patient ambulating without issue.   "

## 2025-07-10 ENCOUNTER — OFFICE VISIT (OUTPATIENT)
Dept: URGENT CARE | Facility: URGENT CARE | Age: 65
End: 2025-07-10

## 2025-07-10 VITALS
SYSTOLIC BLOOD PRESSURE: 100 MMHG | OXYGEN SATURATION: 96 % | RESPIRATION RATE: 20 BRPM | DIASTOLIC BLOOD PRESSURE: 67 MMHG | HEIGHT: 72 IN | BODY MASS INDEX: 18.96 KG/M2 | HEART RATE: 123 BPM | WEIGHT: 140 LBS | TEMPERATURE: 97.9 F

## 2025-07-10 DIAGNOSIS — R19.7 VOMITING AND DIARRHEA: Primary | ICD-10-CM

## 2025-07-10 DIAGNOSIS — R10.84 ABDOMINAL PAIN, GENERALIZED: ICD-10-CM

## 2025-07-10 DIAGNOSIS — R11.10 VOMITING AND DIARRHEA: Primary | ICD-10-CM

## 2025-07-10 NOTE — PROGRESS NOTES
Slab Off:No Urgent Care Clinic Visit  Rapid rooming was initiated.  Provider was consulted, patient ok to wait in lobby.  Chief Complaint   Patient presents with    Urgent Care     Pt states 3 days ago he went to a restaurant and he ate scrambled eggs and hood.Since then he has had diarrhea and vomiting.Any time he eats he vomits.               7/10/2025     1:57 PM   Additional Questions   Roomed by Mariely

## 2025-07-10 NOTE — PROGRESS NOTES
Assessment & Plan       ICD-10-CM    1. Vomiting and diarrhea  R11.10     R19.7       2. Abdominal pain, generalized  R10.84              MDM: Due to patient's abdominal discomfort and vital signs recommended he be evaluated in the emergency department.  Patient verbalized understanding and agreed.    Patient Instructions   Go directly to the Emergency Department.    Patient agreed to the treatment plan and verbalized understanding.       Adi Acevedo is a 64 year old male who presents to clinic today for the following health issues:  Chief Complaint   Patient presents with    Urgent Care     Pt states 3 days ago he went to a restaurant and he ate scrambled eggs and hood.Since then he has had diarrhea and vomiting.Any time he eats he vomits.     HPI  Patient has had bilateral lower abdominal pain for 3 days.  He states he has had sweats and chills.  He states he has not been able to keep any food or fluids down.  He states he is starting to feel very fatigued.  He has not taken any medications for symptoms.      Review of Systems   All other systems reviewed and are negative.      Problem List:  2022-01: Hyperglycemia  2022-01: Pneumonia due to 2019 novel coronavirus  2020-08: Peripheral polyneuropathy  2019-02: Nasal congestion  2019-01: Type 2 diabetes mellitus with neurologic complication,   without long-term current use of insulin (H)      Past Medical History:   Diagnosis Date    Acute facial pain 2/14/2019    Type 2 diabetes mellitus with neurologic complication, without long-term current use of insulin (H) 1/20/2019         Social History     Tobacco Use    Smoking status: Never    Smokeless tobacco: Never   Substance Use Topics    Alcohol use: No           Objective    /67   Pulse (!) 123   Temp 97.9  F (36.6  C)   Resp 20   Ht 1.829 m (6')   Wt 63.5 kg (140 lb)   SpO2 96%   BMI 18.99 kg/m    Physical Exam  Vitals and nursing note reviewed.   Constitutional:       Appearance: He is  ill-appearing.              Antionette Hammonds, NP